# Patient Record
Sex: MALE | Race: WHITE | NOT HISPANIC OR LATINO | Employment: OTHER | URBAN - METROPOLITAN AREA
[De-identification: names, ages, dates, MRNs, and addresses within clinical notes are randomized per-mention and may not be internally consistent; named-entity substitution may affect disease eponyms.]

---

## 2017-03-02 ENCOUNTER — APPOINTMENT (OUTPATIENT)
Dept: RADIOLOGY | Facility: MEDICAL CENTER | Age: 82
End: 2017-03-02
Attending: EMERGENCY MEDICINE
Payer: MEDICARE

## 2017-03-02 ENCOUNTER — RESOLUTE PROFESSIONAL BILLING HOSPITAL PROF FEE (OUTPATIENT)
Dept: HOSPITALIST | Facility: MEDICAL CENTER | Age: 82
End: 2017-03-02
Payer: MEDICARE

## 2017-03-02 ENCOUNTER — HOSPITAL ENCOUNTER (OUTPATIENT)
Facility: MEDICAL CENTER | Age: 82
End: 2017-03-08
Attending: EMERGENCY MEDICINE | Admitting: HOSPITALIST
Payer: MEDICARE

## 2017-03-02 DIAGNOSIS — N30.00 ACUTE CYSTITIS WITHOUT HEMATURIA: ICD-10-CM

## 2017-03-02 DIAGNOSIS — R53.81 DEBILITY: ICD-10-CM

## 2017-03-02 DIAGNOSIS — G45.9 TRANSIENT CEREBRAL ISCHEMIA, UNSPECIFIED TYPE: ICD-10-CM

## 2017-03-02 DIAGNOSIS — R53.1 WEAKNESS: ICD-10-CM

## 2017-03-02 PROBLEM — N39.0 UTI (URINARY TRACT INFECTION): Status: ACTIVE | Noted: 2017-03-02

## 2017-03-02 LAB
ABO GROUP BLD: NORMAL
ALBUMIN SERPL BCP-MCNC: 3.9 G/DL (ref 3.2–4.9)
ALBUMIN/GLOB SERPL: 1.3 G/DL
ALP SERPL-CCNC: 83 U/L (ref 30–99)
ALT SERPL-CCNC: 13 U/L (ref 2–50)
ANION GAP SERPL CALC-SCNC: 8 MMOL/L (ref 0–11.9)
APPEARANCE UR: CLEAR
APTT PPP: 32.7 SEC (ref 24.7–36)
AST SERPL-CCNC: 18 U/L (ref 12–45)
BASOPHILS # BLD AUTO: 0.9 % (ref 0–1.8)
BASOPHILS # BLD: 0.08 K/UL (ref 0–0.12)
BILIRUB SERPL-MCNC: 0.4 MG/DL (ref 0.1–1.5)
BILIRUB UR QL STRIP.AUTO: NEGATIVE
BLD GP AB SCN SERPL QL: NORMAL
BUN SERPL-MCNC: 16 MG/DL (ref 8–22)
CALCIUM SERPL-MCNC: 9.6 MG/DL (ref 8.5–10.5)
CHLORIDE SERPL-SCNC: 104 MMOL/L (ref 96–112)
CO2 SERPL-SCNC: 23 MMOL/L (ref 20–33)
COLOR UR: COLORLESS
CREAT SERPL-MCNC: 0.97 MG/DL (ref 0.5–1.4)
CULTURE IF INDICATED INDCX: YES UA CULTURE
EOSINOPHIL # BLD AUTO: 0.34 K/UL (ref 0–0.51)
EOSINOPHIL NFR BLD: 3.7 % (ref 0–6.9)
ERYTHROCYTE [DISTWIDTH] IN BLOOD BY AUTOMATED COUNT: 41.9 FL (ref 35.9–50)
GFR SERPL CREATININE-BSD FRML MDRD: >60 ML/MIN/1.73 M 2
GLOBULIN SER CALC-MCNC: 3 G/DL (ref 1.9–3.5)
GLUCOSE SERPL-MCNC: 133 MG/DL (ref 65–99)
GLUCOSE UR STRIP.AUTO-MCNC: NEGATIVE MG/DL
HCT VFR BLD AUTO: 46.5 % (ref 42–52)
HGB BLD-MCNC: 16.3 G/DL (ref 14–18)
IMM GRANULOCYTES # BLD AUTO: 0.04 K/UL (ref 0–0.11)
IMM GRANULOCYTES NFR BLD AUTO: 0.4 % (ref 0–0.9)
INR PPP: 0.91 (ref 0.87–1.13)
KETONES UR STRIP.AUTO-MCNC: NEGATIVE MG/DL
LEUKOCYTE ESTERASE UR QL STRIP.AUTO: ABNORMAL
LYMPHOCYTES # BLD AUTO: 2.57 K/UL (ref 1–4.8)
LYMPHOCYTES NFR BLD: 27.7 % (ref 22–41)
MCH RBC QN AUTO: 31.3 PG (ref 27–33)
MCHC RBC AUTO-ENTMCNC: 35.1 G/DL (ref 33.7–35.3)
MCV RBC AUTO: 89.4 FL (ref 81.4–97.8)
MICRO URNS: ABNORMAL
MONOCYTES # BLD AUTO: 1.11 K/UL (ref 0–0.85)
MONOCYTES NFR BLD AUTO: 11.9 % (ref 0–13.4)
NEUTROPHILS # BLD AUTO: 5.15 K/UL (ref 1.82–7.42)
NEUTROPHILS NFR BLD: 55.4 % (ref 44–72)
NITRITE UR QL STRIP.AUTO: NEGATIVE
NRBC # BLD AUTO: 0.02 K/UL
NRBC BLD AUTO-RTO: 0.2 /100 WBC
PH UR STRIP.AUTO: 6.5 [PH]
PLATELET # BLD AUTO: 330 K/UL (ref 164–446)
PMV BLD AUTO: 9.1 FL (ref 9–12.9)
POTASSIUM SERPL-SCNC: 4 MMOL/L (ref 3.6–5.5)
PROT SERPL-MCNC: 6.9 G/DL (ref 6–8.2)
PROT UR QL STRIP: NEGATIVE MG/DL
PROTHROMBIN TIME: 12.5 SEC (ref 12–14.6)
RBC # BLD AUTO: 5.2 M/UL (ref 4.7–6.1)
RBC # URNS HPF: ABNORMAL /HPF
RBC UR QL AUTO: NEGATIVE
RH BLD: NORMAL
SODIUM SERPL-SCNC: 135 MMOL/L (ref 135–145)
SP GR UR STRIP.AUTO: 1
TROPONIN I SERPL-MCNC: 0.03 NG/ML (ref 0–0.04)
WBC # BLD AUTO: 9.3 K/UL (ref 4.8–10.8)
WBC #/AREA URNS HPF: ABNORMAL /HPF

## 2017-03-02 PROCEDURE — 96365 THER/PROPH/DIAG IV INF INIT: CPT

## 2017-03-02 PROCEDURE — 71010 DX-CHEST-PORTABLE (1 VIEW): CPT

## 2017-03-02 PROCEDURE — 96361 HYDRATE IV INFUSION ADD-ON: CPT

## 2017-03-02 PROCEDURE — 99285 EMERGENCY DEPT VISIT HI MDM: CPT

## 2017-03-02 PROCEDURE — 87040 BLOOD CULTURE FOR BACTERIA: CPT

## 2017-03-02 PROCEDURE — 85730 THROMBOPLASTIN TIME PARTIAL: CPT

## 2017-03-02 PROCEDURE — 87086 URINE CULTURE/COLONY COUNT: CPT

## 2017-03-02 PROCEDURE — 86850 RBC ANTIBODY SCREEN: CPT

## 2017-03-02 PROCEDURE — 93005 ELECTROCARDIOGRAM TRACING: CPT | Performed by: EMERGENCY MEDICINE

## 2017-03-02 PROCEDURE — 81001 URINALYSIS AUTO W/SCOPE: CPT

## 2017-03-02 PROCEDURE — G0378 HOSPITAL OBSERVATION PER HR: HCPCS

## 2017-03-02 PROCEDURE — 700105 HCHG RX REV CODE 258: Performed by: EMERGENCY MEDICINE

## 2017-03-02 PROCEDURE — 700111 HCHG RX REV CODE 636 W/ 250 OVERRIDE (IP): Performed by: EMERGENCY MEDICINE

## 2017-03-02 PROCEDURE — 80053 COMPREHEN METABOLIC PANEL: CPT

## 2017-03-02 PROCEDURE — 96375 TX/PRO/DX INJ NEW DRUG ADDON: CPT

## 2017-03-02 PROCEDURE — 85025 COMPLETE CBC W/AUTO DIFF WBC: CPT

## 2017-03-02 PROCEDURE — 70450 CT HEAD/BRAIN W/O DYE: CPT

## 2017-03-02 PROCEDURE — 84443 ASSAY THYROID STIM HORMONE: CPT

## 2017-03-02 PROCEDURE — 86900 BLOOD TYPING SEROLOGIC ABO: CPT

## 2017-03-02 PROCEDURE — 85610 PROTHROMBIN TIME: CPT

## 2017-03-02 PROCEDURE — 84484 ASSAY OF TROPONIN QUANT: CPT

## 2017-03-02 PROCEDURE — 36415 COLL VENOUS BLD VENIPUNCTURE: CPT

## 2017-03-02 PROCEDURE — 86901 BLOOD TYPING SEROLOGIC RH(D): CPT

## 2017-03-02 PROCEDURE — 99220 PR INITIAL OBSERVATION CARE,LEVL III: CPT | Performed by: HOSPITALIST

## 2017-03-02 RX ORDER — LORAZEPAM 2 MG/ML
0.5 INJECTION INTRAMUSCULAR EVERY 6 HOURS PRN
Status: DISCONTINUED | OUTPATIENT
Start: 2017-03-02 | End: 2017-03-08 | Stop reason: HOSPADM

## 2017-03-02 RX ORDER — ACETAMINOPHEN 325 MG/1
650 TABLET ORAL EVERY 6 HOURS PRN
Status: DISCONTINUED | OUTPATIENT
Start: 2017-03-02 | End: 2017-03-03

## 2017-03-02 RX ORDER — ONDANSETRON 2 MG/ML
4 INJECTION INTRAMUSCULAR; INTRAVENOUS EVERY 4 HOURS PRN
Status: DISCONTINUED | OUTPATIENT
Start: 2017-03-02 | End: 2017-03-03

## 2017-03-02 RX ORDER — HEPARIN SODIUM 5000 [USP'U]/ML
5000 INJECTION, SOLUTION INTRAVENOUS; SUBCUTANEOUS EVERY 8 HOURS
Status: DISCONTINUED | OUTPATIENT
Start: 2017-03-03 | End: 2017-03-08 | Stop reason: HOSPADM

## 2017-03-02 RX ORDER — DEXTROSE MONOHYDRATE 25 G/50ML
25 INJECTION, SOLUTION INTRAVENOUS
Status: DISCONTINUED | OUTPATIENT
Start: 2017-03-02 | End: 2017-03-08 | Stop reason: HOSPADM

## 2017-03-02 RX ORDER — SODIUM CHLORIDE 9 MG/ML
1000 INJECTION, SOLUTION INTRAVENOUS ONCE
Status: COMPLETED | OUTPATIENT
Start: 2017-03-02 | End: 2017-03-02

## 2017-03-02 RX ORDER — LORAZEPAM 1 MG/1
0.5 TABLET ORAL EVERY 6 HOURS PRN
Status: DISCONTINUED | OUTPATIENT
Start: 2017-03-02 | End: 2017-03-08 | Stop reason: HOSPADM

## 2017-03-02 RX ORDER — ASPIRIN 325 MG
325 TABLET ORAL DAILY
Status: DISCONTINUED | OUTPATIENT
Start: 2017-03-03 | End: 2017-03-08 | Stop reason: HOSPADM

## 2017-03-02 RX ORDER — ALPRAZOLAM 0.25 MG/1
.25-.5 TABLET ORAL 3 TIMES DAILY PRN
Status: DISCONTINUED | OUTPATIENT
Start: 2017-03-02 | End: 2017-03-03

## 2017-03-02 RX ORDER — SODIUM CHLORIDE 9 MG/ML
INJECTION, SOLUTION INTRAVENOUS CONTINUOUS
Status: DISCONTINUED | OUTPATIENT
Start: 2017-03-03 | End: 2017-03-07

## 2017-03-02 RX ORDER — ONDANSETRON 4 MG/1
4 TABLET, ORALLY DISINTEGRATING ORAL EVERY 4 HOURS PRN
Status: DISCONTINUED | OUTPATIENT
Start: 2017-03-02 | End: 2017-03-03

## 2017-03-02 RX ORDER — ACETAMINOPHEN 325 MG/1
650 TABLET ORAL EVERY 6 HOURS PRN
Status: DISCONTINUED | OUTPATIENT
Start: 2017-03-02 | End: 2017-03-08 | Stop reason: HOSPADM

## 2017-03-02 RX ORDER — ZOLPIDEM TARTRATE 5 MG/1
5 TABLET ORAL
Status: DISCONTINUED | OUTPATIENT
Start: 2017-03-02 | End: 2017-03-08 | Stop reason: HOSPADM

## 2017-03-02 RX ORDER — ATENOLOL 50 MG/1
50 TABLET ORAL DAILY
Status: DISCONTINUED | OUTPATIENT
Start: 2017-03-03 | End: 2017-03-08 | Stop reason: HOSPADM

## 2017-03-02 RX ORDER — BISACODYL 10 MG
10 SUPPOSITORY, RECTAL RECTAL
Status: DISCONTINUED | OUTPATIENT
Start: 2017-03-02 | End: 2017-03-08 | Stop reason: HOSPADM

## 2017-03-02 RX ORDER — LORAZEPAM 2 MG/ML
2 INJECTION INTRAMUSCULAR ONCE
Status: COMPLETED | OUTPATIENT
Start: 2017-03-02 | End: 2017-03-02

## 2017-03-02 RX ORDER — AMOXICILLIN 250 MG
2 CAPSULE ORAL 2 TIMES DAILY
Status: DISCONTINUED | OUTPATIENT
Start: 2017-03-03 | End: 2017-03-08 | Stop reason: HOSPADM

## 2017-03-02 RX ORDER — CEFTRIAXONE 1 G/1
1 INJECTION, POWDER, FOR SOLUTION INTRAMUSCULAR; INTRAVENOUS ONCE
Status: COMPLETED | OUTPATIENT
Start: 2017-03-02 | End: 2017-03-02

## 2017-03-02 RX ORDER — FENOFIBRATE 67 MG/1
134 CAPSULE ORAL DAILY
Status: DISCONTINUED | OUTPATIENT
Start: 2017-03-03 | End: 2017-03-08 | Stop reason: HOSPADM

## 2017-03-02 RX ORDER — ASPIRIN 300 MG/1
300 SUPPOSITORY RECTAL DAILY
Status: DISCONTINUED | OUTPATIENT
Start: 2017-03-03 | End: 2017-03-08 | Stop reason: HOSPADM

## 2017-03-02 RX ORDER — LABETALOL HYDROCHLORIDE 5 MG/ML
10 INJECTION, SOLUTION INTRAVENOUS EVERY 4 HOURS PRN
Status: DISCONTINUED | OUTPATIENT
Start: 2017-03-02 | End: 2017-03-08 | Stop reason: HOSPADM

## 2017-03-02 RX ORDER — HYDRALAZINE HYDROCHLORIDE 20 MG/ML
10 INJECTION INTRAMUSCULAR; INTRAVENOUS
Status: DISCONTINUED | OUTPATIENT
Start: 2017-03-02 | End: 2017-03-08 | Stop reason: HOSPADM

## 2017-03-02 RX ORDER — ASPIRIN 81 MG/1
324 TABLET, CHEWABLE ORAL DAILY
Status: DISCONTINUED | OUTPATIENT
Start: 2017-03-03 | End: 2017-03-08 | Stop reason: HOSPADM

## 2017-03-02 RX ORDER — POLYETHYLENE GLYCOL 3350 17 G/17G
1 POWDER, FOR SOLUTION ORAL
Status: DISCONTINUED | OUTPATIENT
Start: 2017-03-02 | End: 2017-03-08 | Stop reason: HOSPADM

## 2017-03-02 RX ORDER — PRAVASTATIN SODIUM 20 MG
40 TABLET ORAL NIGHTLY
Status: DISCONTINUED | OUTPATIENT
Start: 2017-03-03 | End: 2017-03-07

## 2017-03-02 RX ORDER — LORAZEPAM 2 MG/ML
0.5 INJECTION INTRAMUSCULAR ONCE
Status: DISCONTINUED | OUTPATIENT
Start: 2017-03-02 | End: 2017-03-02

## 2017-03-02 RX ORDER — LISINOPRIL 20 MG/1
20 TABLET ORAL 2 TIMES DAILY
Status: DISCONTINUED | OUTPATIENT
Start: 2017-03-03 | End: 2017-03-08 | Stop reason: HOSPADM

## 2017-03-02 RX ORDER — CEFTRIAXONE 1 G/1
1 INJECTION, POWDER, FOR SOLUTION INTRAMUSCULAR; INTRAVENOUS
Status: DISCONTINUED | OUTPATIENT
Start: 2017-03-03 | End: 2017-03-03

## 2017-03-02 RX ADMIN — CEFTRIAXONE 1 G: 1 INJECTION, POWDER, FOR SOLUTION INTRAMUSCULAR; INTRAVENOUS at 20:47

## 2017-03-02 RX ADMIN — SODIUM CHLORIDE 1000 ML: 9 INJECTION, SOLUTION INTRAVENOUS at 19:35

## 2017-03-02 RX ADMIN — LORAZEPAM 2 MG: 2 INJECTION INTRAMUSCULAR; INTRAVENOUS at 21:12

## 2017-03-02 ASSESSMENT — ENCOUNTER SYMPTOMS
WEAKNESS: 1
FEVER: 0
VOMITING: 0
DIARRHEA: 0
ABDOMINAL PAIN: 0
NAUSEA: 0
COUGH: 0
CHILLS: 0

## 2017-03-02 ASSESSMENT — PAIN SCALES - GENERAL: PAINLEVEL_OUTOF10: 9

## 2017-03-02 ASSESSMENT — LIFESTYLE VARIABLES: DO YOU DRINK ALCOHOL: NO

## 2017-03-02 NOTE — IP AVS SNAPSHOT
" <p align=\"LEFT\"><IMG SRC=\"//[email or web address]$/[email or web address]\" alt=\"Image\" WIDTH=\"50%\" HEIGHT=\"200\" BORDER=\"\"></p>                   Name:Leonardo Rodney  Medical Record Number:[id number]  CSN: [id number]    YOB: 1935   Age: 81 y.o.  Sex: male  HT:1.88 m (6' 2\") WT: 114.8 kg (253 lb 1.4 oz)          Admit Date: 3/2/2017     Discharge Date:   Today's Date: 3/8/2017  Attending Doctor:  Abhinav Salazar M.D.                  Allergies:  Review of patient's allergies indicates no known allergies.          Follow-up Information     1. Follow up with KESHAWN Medina. Go on 4/10/2017.    Specialty:  Family Medicine    Why:  Please arrive at 2:40 pm for your appointment. Thank you.    Contact information    [address] [place] [address] [id number] [phone]          2. Follow up with [facility] ([facility]).    Specialty:  Skilled Nursing Facility    Contact information    [address] [place] [id number] [phone]        3. Follow up with KESHAWN Medina.    Specialty:  Family Medicine    Contact information    [address] [place] [address] [address] [phone]           Medication List      Take these Medications        Instructions    Acetaminophen 650 MG Tabs    Take 650 mg by mouth every 6 hours as needed for Fever or Mild Pain (Temp greater than 100.5 F or Mild Pain (1-3)).   Dose:  650 mg       aspirin 325 MG Tabs   Commonly known as:  ASA    Take 1 Tab by mouth every day.   Dose:  325 mg       atenolol 50 MG Tabs   Commonly known as:  TENORMIN    Take 50 mg by mouth every day.   Dose:  50 mg       atorvastatin 40 MG Tabs   Commonly known as:  LIPITOR    Take 1 Tab by mouth every bedtime.   Dose:  40 mg       fenofibrate 145 MG Tabs   Commonly known as:  TRICOR    Take 145 mg by mouth every day.   Dose:  145 mg       heparin 5000 UNIT/ML Soln    Inject 1 mL as instructed every 8 hours.   Dose:  5000 Units       insulin lispro 100 UNIT/ML Soln   Commonly known " as:  HUMALOG    Inject 2-9 Units as instructed 4 Times a Day,Before Meals and at Bedtime.   Dose:  2-9 Units       lisinopril 20 MG Tabs   Commonly known as:  PRINIVIL    Take 20 mg by mouth 2 times a day.   Dose:  20 mg       multivitamin Tabs    Take 1 Tab by mouth every day.   Dose:  1 Tab       nystatin Powd   Commonly known as:  MYCOSTATIN    Apply to abdominal folds to fungal rash       oxybutynin 5 MG Tabs   Commonly known as:  DITROPAN    Take 5 mg by mouth 3 times a day as needed.    Indications: Urinary Urgency   Dose:  5 mg

## 2017-03-02 NOTE — IP AVS SNAPSHOT
3/8/2017          Leonardo Rodney  [address]  [place] [address]    Dear Leonardo:    [facility] wants to ensure your discharge home is safe and you or your loved ones have had all your questions answered regarding your care after you leave the hospital.    You may receive a telephone call within two days of your discharge.  This call is to make certain you understand your discharge instructions as well as ensure we provided you with the best care possible during your stay with us.     The call will only last approximately 3-5 minutes and will be done by a nurse.    Once again, we want to ensure your discharge home is safe and that you have a clear understanding of any next steps in your care.  If you have any questions or concerns, please do not hesitate to contact us, we are here for you.  Thank you for choosing [facility] for your healthcare needs.    Sincerely,    Erickson Seo    [facility] [place] [facility]

## 2017-03-02 NOTE — IP AVS SNAPSHOT
CareXtend Access Code: [email or web address]  Expires: 4/7/2017  4:47 PM    Your email address is not on file at [facility].  Email Addresses are required for you to sign up for CareXtend, please contact [phone] to verify your personal information and to provide your email address prior to attempting to register for CareXtend.    Leonardo PATRICK Rodney  [address]  [place] [address]    H-caret  A secure, online tool to manage your health information     [facility]’s CareXtend® is a secure, online tool that connects you to your personalized health information from the privacy of your home -- day or night - making it very easy for you to manage your healthcare. Once the activation process is completed, you can even access your medical information using the CareXtend layne, which is available for free in the Apple Layne store or Google Play store.     To learn more about CareXtend, visit [email or web address]    There are two levels of access available (as shown below):   My Chart Features  [facility] Primary Care Doctor [facility]  Specialists [facility]  Urgent  Care Non-[facility] Primary Care Doctor   Email your healthcare team securely and privately 24/7 X X X    Manage appointments: schedule your next appointment; view details of past/upcoming appointments X      Request prescription refills. X      View recent personal medical records, including lab and immunizations X X X X   View health record, including health history, allergies, medications X X X X   Read reports about your outpatient visits, procedures, consult and ER notes X X X X   See your discharge summary, which is a recap of your hospital and/or ER visit that includes your diagnosis, lab results, and care plan X X  X     How to register for CareXtend:  Once your e-mail address has been verified, follow the following steps to sign up for CareXtend.     1. Go to  [email or web address]  2. Click on the Sign Up Now box, which takes you to the New Member Sign Up page. You  will need to provide the following information:  a. Enter your [employer] Access Code exactly as it appears at the top of this page. (You will not need to use this code after you’ve completed the sign-up process. If you do not sign up before the expiration date, you must request a new code.)   b. Enter your date of birth.   c. Enter your home email address.   d. Click Submit, and follow the next screen’s instructions.  3. Create a FeZot ID. This will be your [employer] login ID and cannot be changed, so think of one that is secure and easy to remember.  4. Create a [employer] password. You can change your password at any time.  5. Enter your Password Reset Question and Answer. This can be used at a later time if you forget your password.   6. Enter your e-mail address. This allows you to receive e-mail notifications when new information is available in [employer].  7. Click Sign Up. You can now view your health information.    For assistance activating your [employer] account, call [phone]

## 2017-03-02 NOTE — IP AVS SNAPSHOT
" Home Care Instructions                                                                                                                  Name:Leonardo Rodney  Medical Record Number:[id number]  CSN: [id number]    YOB: 1935   Age: 81 y.o.  Sex: male  HT:1.88 m (6' 2\") WT: 114.8 kg (253 lb 1.4 oz)          Admit Date: 3/2/2017     Discharge Date:   Today's Date: 3/8/2017  Attending Doctor:  Abhinav Salazar M.D.                  Allergies:  Review of patient's allergies indicates no known allergies.            Discharge Instructions       Discharge Instructions    Discharged to other by ambulance with escort. Discharged via ambulance, hospital escort: Yes.  Special equipment needed: Not Applicable    Be sure to schedule a follow-up appointment with your primary care doctor or any specialists as instructed.     Discharge Plan:   Diet Plan: Discussed  Activity Level: Discussed  Confirmed Follow up Appointment: Appointment Scheduled  Confirmed Symptoms Management: Discussed  Medication Reconciliation Updated: Yes  Influenza Vaccine Indication: Not indicated: Previously immunized this influenza season and > 8 years of age    I understand that a diet low in cholesterol, fat, and sodium is recommended for good health. Unless I have been given specific instructions below for another diet, I accept this instruction as my diet prescription.   Other diet: Regular, thin liquids    Special Instructions: None    · Is patient discharged on Warfarin / Coumadin?   No     · Is patient Post Blood Transfusion?  No    Depression / Suicide Risk    As you are discharged from this [facility] facility, it is important to learn how to keep safe from harming yourself.    Recognize the warning signs:  · Abrupt changes in personality, positive or negative- including increase in energy   · Giving away possessions  · Change in eating patterns- significant weight changes-  positive or negative  · Change in sleeping patterns- unable " to sleep or sleeping all the time   · Unwillingness or inability to communicate  · Depression  · Unusual sadness, discouragement and loneliness  · Talk of wanting to die  · Neglect of personal appearance   · Rebelliousness- reckless behavior  · Withdrawal from people/activities they love  · Confusion- inability to concentrate     If you or a loved one observes any of these behaviors or has concerns about self-harm, here's what you can do:  · Talk about it- your feelings and reasons for harming yourself  · Remove any means that you might use to hurt yourself (examples: pills, rope, extension cords, firearm)  · Get professional help from the community (Mental Health, Substance Abuse, psychological counseling)  · Do not be alone:Call your Safe Contact- someone whom you trust who will be there for you.  · Call your local CRISIS HOTLINE 807-7198 or 876-960-5536  · Call your local Children's Mobile Crisis Response Team [place] (286) 341-8391 or www.HyperQuest  · Call the toll free National Suicide Prevention Hotlines   · National Suicide Prevention Lifeline 349-875-YKMU (1843)  · National Hope Line Network 800-SUICIDE (811-0423)        Follow-up Information     1. Follow up with KESHAWN Medina. Go on 4/10/2017.    Specialty:  Family Medicine    Why:  Please arrive at 2:40 pm for your appointment. Thank you.    Contact information    [address] [address]  [phone]          2. Follow up with [facility] [place] ([facility]).    Specialty:  Skilled Nursing Facility    Contact information    [address] [address]  [phone]        3. Follow up with KESHAWN Medina.    Specialty:  Family Medicine    Contact information    [address] [address]  [phone]           Discharge Medication Instructions:    Below are the medications your physician expects you to take upon discharge:    Review all your home medications and newly ordered  medications with your doctor and/or pharmacist. Follow medication instructions as directed by your doctor and/or pharmacist.    Please keep your medication list with you and share with your physician.               Medication List      START taking these medications        Instructions    aspirin 325 MG Tabs   Last time this was given:  325 mg on 3/8/2017  9:23 AM   Commonly known as:  ASA    Take 1 Tab by mouth every day.   Dose:  325 mg       atorvastatin 40 MG Tabs   Last time this was given:  40 mg on 3/7/2017  8:30 PM   Commonly known as:  LIPITOR    Take 1 Tab by mouth every bedtime.   Dose:  40 mg       heparin 5000 UNIT/ML Soln   Last time this was given:  5,000 Units on 3/8/2017  2:33 PM    Inject 1 mL as instructed every 8 hours.   Dose:  5000 Units       insulin lispro 100 UNIT/ML Soln   Last time this was given:  3 Units on 3/8/2017 12:23 PM   Commonly known as:  HUMALOG    Inject 2-9 Units as instructed 4 Times a Day,Before Meals and at Bedtime.   Dose:  2-9 Units       nystatin Powd   Last time this was given:  1,500,000 Units on 3/8/2017  2:37 PM   Commonly known as:  MYCOSTATIN    Apply to abdominal folds to fungal rash         CONTINUE taking these medications        Instructions    Acetaminophen 650 MG Tabs   Last time this was given:  650 mg on 3/4/2017  8:42 PM    Take 650 mg by mouth every 6 hours as needed for Fever or Mild Pain (Temp greater than 100.5 F or Mild Pain (1-3)).   Dose:  650 mg       atenolol 50 MG Tabs   Last time this was given:  50 mg on 3/8/2017  9:23 AM   Commonly known as:  TENORMIN    Take 50 mg by mouth every day.   Dose:  50 mg       fenofibrate 145 MG Tabs   Commonly known as:  TRICOR    Take 145 mg by mouth every day.   Dose:  145 mg       lisinopril 20 MG Tabs   Last time this was given:  20 mg on 3/8/2017  9:24 AM   Commonly known as:  PRINIVIL    Take 20 mg by mouth 2 times a day.   Dose:  20 mg       multivitamin Tabs   Last time this was given:  1 Tab on 3/8/2017   9:24 AM    Take 1 Tab by mouth every day.   Dose:  1 Tab       oxybutynin 5 MG Tabs   Commonly known as:  DITROPAN    Take 5 mg by mouth 3 times a day as needed.    Indications: Urinary Urgency   Dose:  5 mg         STOP taking these medications     pravastatin 40 MG tablet   Commonly known as:  PRAVACHOL               Instructions           Diet / Nutrition:    Follow any diet instructions given to you by your doctor or the dietician, including how much salt (sodium) you are allowed each day.    If you are overweight, talk to your doctor about a weight reduction plan.    Activity:    Remain physically active following your doctor's instructions about exercise and activity.    Rest often.     Any time you become even a little tired or short of breath, SIT DOWN and rest.    Worsening Symptoms:    Report any of the following signs and symptoms to the doctor's office immediately:    *Pain of jaw, arm, or neck  *Chest pain not relieved by medication                               *Dizziness or loss of consciousness  *Difficulty breathing even when at rest   *More tired than usual                                       *Bleeding drainage or swelling of surgical site  *Swelling of feet, ankles, legs or stomach                 *Fever (>100ºF)  *Pink or blood tinged sputum  *Weight gain (3lbs/day or 5lbs /week)           *Shock from internal defibrillator (if applicable)  *Palpitations or irregular heartbeats                *Cool and/or numb extremities    Stroke Awareness    Common Risk Factors for Stroke include:    Age  Atrial Fibrillation  Carotid Artery Stenosis  Diabetes Mellitus  Excessive alcohol consumption  High blood pressure  Overweight   Physical inactivity  Smoking    Warning signs and symptoms of a stroke include:    *Sudden numbness or weakness of the face, arm or leg (especially on one side of the body).  *Sudden confusion, trouble speaking or understanding.  *Sudden trouble seeing in one or both  eyes.  *Sudden trouble walking, dizziness, loss of balance or coordination.Sudden severe headache with no known cause.    It is very important to get treatment quickly when a stroke occurs. If you experience any of the above warning signs, call 911 immediately.                   Disclaimer         Quit Smoking / Tobacco Use:    I understand the use of any tobacco products increases my chance of suffering from future heart disease or stroke and could cause other illnesses which may shorten my life. Quitting the use of tobacco products is the single most important thing I can do to improve my health. For further information on smoking / tobacco cessation call a Toll Free Quit Line at 1-339.759.5466 (*National Cancer Welcome) or 1-740.491.1188 (American Lung Association) or you can access the web based program at www.lungCloudAptitude.org.    [place] Tobacco Users Help Line:  (815) 884-2200       Toll Free: 1-780.628.1222  Quit Tobacco Program [facility] (485)785-6680    Crisis Hotline:    [place] Crisis Hotline:  8-638-AOBVNRX or 1-551.286.7968    [place] Crisis Hotline:    1-867.115.7464 or 233-348-7268    Discharge Survey:   Thank you for choosing [facility]. We hope we did everything we could to make your hospital stay a pleasant one. You may be receiving a phone survey and we would appreciate your time and participation in answering the questions. Your input is very valuable to us in our efforts to improve our service to our patients and their families.        My signature on this form indicates that:    1. I have reviewed and understand the above information.  2. My questions regarding this information have been answered to my satisfaction.  3. I have formulated a plan with my discharge nurse to obtain my prescribed medications for home.                  Disclaimer         __________________________________                     __________       ________                       Patient Signature                                                  Date                    Time

## 2017-03-03 ENCOUNTER — APPOINTMENT (OUTPATIENT)
Dept: RADIOLOGY | Facility: MEDICAL CENTER | Age: 82
End: 2017-03-03
Attending: HOSPITALIST
Payer: MEDICARE

## 2017-03-03 PROBLEM — I10 HTN (HYPERTENSION): Status: ACTIVE | Noted: 2017-03-03

## 2017-03-03 PROBLEM — E78.5 DYSLIPIDEMIA: Status: ACTIVE | Noted: 2017-03-03

## 2017-03-03 PROBLEM — E11.9 TYPE 2 DIABETES MELLITUS (HCC): Status: ACTIVE | Noted: 2017-03-03

## 2017-03-03 LAB
ABO GROUP BLD: NORMAL
ANION GAP SERPL CALC-SCNC: 9 MMOL/L (ref 0–11.9)
BUN SERPL-MCNC: 12 MG/DL (ref 8–22)
CALCIUM SERPL-MCNC: 9.7 MG/DL (ref 8.5–10.5)
CHLORIDE SERPL-SCNC: 105 MMOL/L (ref 96–112)
CHOLEST SERPL-MCNC: 141 MG/DL (ref 100–199)
CO2 SERPL-SCNC: 24 MMOL/L (ref 20–33)
CREAT SERPL-MCNC: 0.89 MG/DL (ref 0.5–1.4)
ERYTHROCYTE [DISTWIDTH] IN BLOOD BY AUTOMATED COUNT: 41.8 FL (ref 35.9–50)
EST. AVERAGE GLUCOSE BLD GHB EST-MCNC: 166 MG/DL
GFR SERPL CREATININE-BSD FRML MDRD: >60 ML/MIN/1.73 M 2
GLUCOSE BLD-MCNC: 113 MG/DL (ref 65–99)
GLUCOSE BLD-MCNC: 120 MG/DL (ref 65–99)
GLUCOSE BLD-MCNC: 147 MG/DL (ref 65–99)
GLUCOSE BLD-MCNC: 168 MG/DL (ref 65–99)
GLUCOSE SERPL-MCNC: 127 MG/DL (ref 65–99)
HBA1C MFR BLD: 7.4 % (ref 0–5.6)
HCT VFR BLD AUTO: 47.4 % (ref 42–52)
HDLC SERPL-MCNC: 38 MG/DL
HGB BLD-MCNC: 16.5 G/DL (ref 14–18)
LDLC SERPL CALC-MCNC: 81 MG/DL
LV EJECT FRACT  99904: 55
LV EJECT FRACT MOD 2C 99903: 48.6
LV EJECT FRACT MOD 4C 99902: 52.96
LV EJECT FRACT MOD BP 99901: 57.44
MCH RBC QN AUTO: 31 PG (ref 27–33)
MCHC RBC AUTO-ENTMCNC: 34.8 G/DL (ref 33.7–35.3)
MCV RBC AUTO: 89.1 FL (ref 81.4–97.8)
PLATELET # BLD AUTO: 315 K/UL (ref 164–446)
PMV BLD AUTO: 8.8 FL (ref 9–12.9)
POTASSIUM SERPL-SCNC: 3.7 MMOL/L (ref 3.6–5.5)
RBC # BLD AUTO: 5.32 M/UL (ref 4.7–6.1)
SODIUM SERPL-SCNC: 138 MMOL/L (ref 135–145)
TRIGL SERPL-MCNC: 109 MG/DL (ref 0–149)
TSH SERPL DL<=0.005 MIU/L-ACNC: 2.87 UIU/ML (ref 0.3–3.7)
WBC # BLD AUTO: 9.3 K/UL (ref 4.8–10.8)

## 2017-03-03 PROCEDURE — 80061 LIPID PANEL: CPT

## 2017-03-03 PROCEDURE — 700111 HCHG RX REV CODE 636 W/ 250 OVERRIDE (IP): Performed by: HOSPITALIST

## 2017-03-03 PROCEDURE — G8996 SWALLOW CURRENT STATUS: HCPCS | Mod: CI

## 2017-03-03 PROCEDURE — G8979 MOBILITY GOAL STATUS: HCPCS | Mod: CI

## 2017-03-03 PROCEDURE — G8997 SWALLOW GOAL STATUS: HCPCS | Mod: CH

## 2017-03-03 PROCEDURE — 85027 COMPLETE CBC AUTOMATED: CPT

## 2017-03-03 PROCEDURE — 93880 EXTRACRANIAL BILAT STUDY: CPT

## 2017-03-03 PROCEDURE — G0378 HOSPITAL OBSERVATION PER HR: HCPCS

## 2017-03-03 PROCEDURE — 80048 BASIC METABOLIC PNL TOTAL CA: CPT

## 2017-03-03 PROCEDURE — 99225 PR SUBSEQUENT OBSERVATION CARE,LEVEL II: CPT | Performed by: INTERNAL MEDICINE

## 2017-03-03 PROCEDURE — 36415 COLL VENOUS BLD VENIPUNCTURE: CPT

## 2017-03-03 PROCEDURE — 93306 TTE W/DOPPLER COMPLETE: CPT

## 2017-03-03 PROCEDURE — 93880 EXTRACRANIAL BILAT STUDY: CPT | Mod: 26 | Performed by: SURGERY

## 2017-03-03 PROCEDURE — 93306 TTE W/DOPPLER COMPLETE: CPT | Mod: 26 | Performed by: INTERNAL MEDICINE

## 2017-03-03 PROCEDURE — 92610 EVALUATE SWALLOWING FUNCTION: CPT

## 2017-03-03 PROCEDURE — 83036 HEMOGLOBIN GLYCOSYLATED A1C: CPT

## 2017-03-03 PROCEDURE — G8978 MOBILITY CURRENT STATUS: HCPCS | Mod: CK

## 2017-03-03 PROCEDURE — 96366 THER/PROPH/DIAG IV INF ADDON: CPT

## 2017-03-03 PROCEDURE — 97162 PT EVAL MOD COMPLEX 30 MIN: CPT

## 2017-03-03 PROCEDURE — A9270 NON-COVERED ITEM OR SERVICE: HCPCS | Performed by: HOSPITALIST

## 2017-03-03 PROCEDURE — G8987 SELF CARE CURRENT STATUS: HCPCS | Mod: CL

## 2017-03-03 PROCEDURE — 700105 HCHG RX REV CODE 258: Performed by: HOSPITALIST

## 2017-03-03 PROCEDURE — G8988 SELF CARE GOAL STATUS: HCPCS | Mod: CI

## 2017-03-03 PROCEDURE — 97166 OT EVAL MOD COMPLEX 45 MIN: CPT

## 2017-03-03 PROCEDURE — 82962 GLUCOSE BLOOD TEST: CPT

## 2017-03-03 PROCEDURE — 700102 HCHG RX REV CODE 250 W/ 637 OVERRIDE(OP): Performed by: HOSPITALIST

## 2017-03-03 RX ADMIN — HEPARIN SODIUM 5000 UNITS: 5000 INJECTION, SOLUTION INTRAVENOUS; SUBCUTANEOUS at 10:17

## 2017-03-03 RX ADMIN — ATENOLOL 50 MG: 50 TABLET ORAL at 10:09

## 2017-03-03 RX ADMIN — THERA TABS 1 TABLET: TAB at 10:09

## 2017-03-03 RX ADMIN — NYSTATIN 1 APPLICATION: 100000 POWDER TOPICAL at 15:00

## 2017-03-03 RX ADMIN — PRAVASTATIN SODIUM 40 MG: 20 TABLET ORAL at 20:29

## 2017-03-03 RX ADMIN — Medication 2 TABLET: at 10:09

## 2017-03-03 RX ADMIN — PRAVASTATIN SODIUM 40 MG: 20 TABLET ORAL at 02:13

## 2017-03-03 RX ADMIN — ASPIRIN 325 MG: 325 TABLET, COATED ORAL at 10:09

## 2017-03-03 RX ADMIN — LISINOPRIL 20 MG: 20 TABLET ORAL at 10:09

## 2017-03-03 RX ADMIN — NYSTATIN 1500000 UNITS: 100000 POWDER TOPICAL at 20:33

## 2017-03-03 RX ADMIN — LISINOPRIL 20 MG: 20 TABLET ORAL at 20:30

## 2017-03-03 RX ADMIN — HEPARIN SODIUM 5000 UNITS: 5000 INJECTION, SOLUTION INTRAVENOUS; SUBCUTANEOUS at 20:42

## 2017-03-03 RX ADMIN — CEFTRIAXONE SODIUM 1 G: 1 INJECTION, POWDER, FOR SOLUTION INTRAMUSCULAR; INTRAVENOUS at 02:06

## 2017-03-03 RX ADMIN — SODIUM CHLORIDE: 9 INJECTION, SOLUTION INTRAVENOUS at 01:21

## 2017-03-03 RX ADMIN — HEPARIN SODIUM 5000 UNITS: 5000 INJECTION, SOLUTION INTRAVENOUS; SUBCUTANEOUS at 17:00

## 2017-03-03 RX ADMIN — HEPARIN SODIUM 5000 UNITS: 5000 INJECTION, SOLUTION INTRAVENOUS; SUBCUTANEOUS at 02:14

## 2017-03-03 RX ADMIN — Medication 2 TABLET: at 20:29

## 2017-03-03 RX ADMIN — LORAZEPAM 0.5 MG: 2 INJECTION INTRAMUSCULAR; INTRAVENOUS at 02:17

## 2017-03-03 RX ADMIN — CEFTRIAXONE SODIUM 1 G: 1 INJECTION, POWDER, FOR SOLUTION INTRAMUSCULAR; INTRAVENOUS at 21:00

## 2017-03-03 RX ADMIN — Medication 2 TABLET: at 02:14

## 2017-03-03 ASSESSMENT — GAIT ASSESSMENTS: GAIT LEVEL OF ASSIST: UNABLE TO PARTICIPATE

## 2017-03-03 ASSESSMENT — PAIN SCALES - GENERAL
PAINLEVEL_OUTOF10: 0

## 2017-03-03 ASSESSMENT — LIFESTYLE VARIABLES
EVER HAD A DRINK FIRST THING IN THE MORNING TO STEADY YOUR NERVES TO GET RID OF A HANGOVER: NO
CONSUMPTION TOTAL: NEGATIVE
ALCOHOL_USE: YES
HAVE PEOPLE ANNOYED YOU BY CRITICIZING YOUR DRINKING: NO
TOTAL SCORE: 0
HOW MANY TIMES IN THE PAST YEAR HAVE YOU HAD 5 OR MORE DRINKS IN A DAY: 0
HAVE YOU EVER FELT YOU SHOULD CUT DOWN ON YOUR DRINKING: NO
EVER_SMOKED: NEVER
ON A TYPICAL DAY WHEN YOU DRINK ALCOHOL HOW MANY DRINKS DO YOU HAVE: 2
AVERAGE NUMBER OF DAYS PER WEEK YOU HAVE A DRINK CONTAINING ALCOHOL: 1
EVER FELT BAD OR GUILTY ABOUT YOUR DRINKING: NO

## 2017-03-03 ASSESSMENT — ENCOUNTER SYMPTOMS
CHILLS: 0
SHORTNESS OF BREATH: 0
DOUBLE VISION: 0
PALPITATIONS: 0
WEIGHT LOSS: 0
NECK PAIN: 0
COUGH: 0
MYALGIAS: 0
PHOTOPHOBIA: 0

## 2017-03-03 ASSESSMENT — ACTIVITIES OF DAILY LIVING (ADL): TOILETING: UNABLE TO DETERMINE AT THIS TIME

## 2017-03-03 NOTE — ED NOTES
Med rec unable to obtain  Pt in unable to answer what medication he takes or where he fills medication   Called emergency contacts with two non working numbers and a number not belonging to the Pt or contact

## 2017-03-03 NOTE — ED NOTES
Pt has been persistently restless and anxious, continually on call light, needs being addressed. ERP aware of anxiety and restlessness, Pt medicated w/ Ativan per MAR.

## 2017-03-03 NOTE — THERAPY
"Occupational Therapy Evaluation completed.   Functional Status:  Pt presenting to skilled OT services with significant decline with ADLs, impaired balance, decreased activity tolerance, and cognition. Performed supine<>sit with min/mod a and vc's for techniques and proper hand placement, STS from eob with min a using FWW, donning/doffing hospital gown with min a, toileting max a, LB dressing max a, had multiple posterior lob and required min a to correct, in standing unable to wt-shift.  Pt would benefit from acute and post acute skilled OT services prior to d/c home  Plan of Care: Will benefit from Occupational Therapy 3 times per week  Discharge Recommendations:  Equipment: Will Continue to Assess for Equipment Needs. Post-acute therapy recommended before discharged home.    See \"Rehab Therapy-Acute\" Patient Summary Report for complete documentation.    "

## 2017-03-03 NOTE — ED PROVIDER NOTES
ED Provider Note    Scribed for Jacob Leone M.D. by Jorge Pradhan. 3/2/2017, 6:13 PM.    Primary care provider: KESHAWN Medina  Means of arrival: Ambulance  History obtained from: Patient  History limited by: None     CHIEF COMPLAINT  Chief Complaint   Patient presents with   • Weakness     HPI  Leonardo Rodney is a 81 y.o. male who was transported to the Emergency Department via EMS due to weakness. The patient has had worsening generalized weakness in the last few days. This morning, patient woke up and states he has not been able to ambulate secondary to his worsening weakness. Patient last ambulated last night at 10:30 PM when he used the restroom. Patient's weakness is worse on his left side. He denies any recent trauma or injury. The patient has a history of Diabetes with resulting neuropathy. Patient denies any history of CVA.  He denies any nausea, vomiting, abdominal pain, diarrhea, fever, chills, cough.     REVIEW OF SYSTEMS  Review of Systems   Constitutional: Negative for fever and chills.   Respiratory: Negative for cough.    Gastrointestinal: Negative for nausea, vomiting, abdominal pain and diarrhea.   Neurological: Positive for weakness.   All other systems reviewed and are negative.    C.     PAST MEDICAL HISTORY   has a past medical history of Diabetes; Hypertension; Neuropathy (CMS-HCC); and CAD (coronary artery disease).    SURGICAL HISTORY  patient denies any surgical history    SOCIAL HISTORY  Social History   Substance Use Topics   • Smoking status: Former Smoker -- 4.00 packs/day   • Smokeless tobacco: None   • Alcohol Use: No      Comment: quit 1998      History   Drug Use No     FAMILY HISTORY  History reviewed. No pertinent family history.    CURRENT MEDICATIONS  Home Medications     Reviewed by [place] (Pharmacy Tech) on 03/02/17 at 2138  Med List Status: Unable to Obtain    Medication Last Dose Status    acetaminophen 650 MG TABS  Active    alprazolam  "(XANAX) 0.5 MG TABS  Active    atenolol (TENORMIN) 50 MG TABS 1/9/2013 Active    ciprofloxacin (CIPRO) 500 MG TABS  Active    fenofibrate (TRICOR) 145 MG TABS 1/9/2013 Active    insulin aspart (NOVOLOG) 100 UNIT/ML SOLN  Active    lisinopril (PRINIVIL) 20 MG TABS 1/9/2013 Active    magnesium hydroxide (MILK OF MAGNESIA) 400 MG/5ML SUSP  Active    multivitamin (THERAGRAN) TABS  Active    oxybutynin (DITROPAN) 5 MG TABS 1/9/2013 Active    pravastatin (PRAVACHOL) 40 MG tablet 1/9/2013 Active              ALLERGIES  No Known Allergies    PHYSICAL EXAM  VITAL SIGNS: /100 mmHg  Pulse 92  Temp(Src) 36.9 °C (98.4 °F)  Resp 16  Ht 1.88 m (6' 2.02\")  SpO2 94%    Constitutional: Elderly appearing, no acute distress   HENT: Normocephalic, Atraumatic, Bilateral external ears normal, oropharynx moist, No oral exudates, Nose normal.   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  Lymphatic: No lymphadenopathy noted.   Cardiovascular: Regular rate and rhythm without murmurs gallops or rubs.   Thorax & Lungs: No respiratory distress. Breathing comfortably. Lungs are clear to auscultation bilaterally, there are no wheezes no rales. Chest wall is nontender.  Abdomen: Soft, nontender, nondistended. Bowel sounds are present.   Skin:  Abrasions to bilateral lower extremities   Back: No tenderness, No CVA tenderness.  Musculoskeletal: No tenderness to palpation or major deformities noted. Intact distal pulses, no clubbing, no cyanosis  Neurologic: Alert & oriented x 3, Cranial nerves II-XII intact, sensation intact throughout, 2/5 strength in left lower extremity, 3/5 strength in left upper extremity, 4/5 strength in right upper and right lower extremities. DTR's are 1+   Psychiatric: Affect normal, Judgment normal, Mood normal.     LABS  Results for orders placed or performed during the hospital encounter of 03/02/17   CBC WITH DIFFERENTIAL "   Result Value Ref Range    WBC 9.3 4.8 - 10.8 K/uL    RBC 5.20 4.70 - 6.10 M/uL    Hemoglobin 16.3 14.0 - 18.0 g/dL    Hematocrit 46.5 42.0 - 52.0 %    MCV 89.4 81.4 - 97.8 fL    MCH 31.3 27.0 - 33.0 pg    MCHC 35.1 33.7 - 35.3 g/dL    RDW 41.9 35.9 - 50.0 fL    Platelet Count 330 164 - 446 K/uL    MPV 9.1 9.0 - 12.9 fL    Neutrophils-Polys 55.40 44.00 - 72.00 %    Lymphocytes 27.70 22.00 - 41.00 %    Monocytes 11.90 0.00 - 13.40 %    Eosinophils 3.70 0.00 - 6.90 %    Basophils 0.90 0.00 - 1.80 %    Immature Granulocytes 0.40 0.00 - 0.90 %    Nucleated RBC 0.20 /100 WBC    Neutrophils (Absolute) 5.15 1.82 - 7.42 K/uL    Lymphs (Absolute) 2.57 1.00 - 4.80 K/uL    Monos (Absolute) 1.11 (H) 0.00 - 0.85 K/uL    Eos (Absolute) 0.34 0.00 - 0.51 K/uL    Baso (Absolute) 0.08 0.00 - 0.12 K/uL    Immature Granulocytes (abs) 0.04 0.00 - 0.11 K/uL    NRBC (Absolute) 0.02 K/uL   COMP METABOLIC PANEL   Result Value Ref Range    Sodium 135 135 - 145 mmol/L    Potassium 4.0 3.6 - 5.5 mmol/L    Chloride 104 96 - 112 mmol/L    Co2 23 20 - 33 mmol/L    Anion Gap 8.0 0.0 - 11.9    Glucose 133 (H) 65 - 99 mg/dL    Bun 16 8 - 22 mg/dL    Creatinine 0.97 0.50 - 1.40 mg/dL    Calcium 9.6 8.5 - 10.5 mg/dL    AST(SGOT) 18 12 - 45 U/L    ALT(SGPT) 13 2 - 50 U/L    Alkaline Phosphatase 83 30 - 99 U/L    Total Bilirubin 0.4 0.1 - 1.5 mg/dL    Albumin 3.9 3.2 - 4.9 g/dL    Total Protein 6.9 6.0 - 8.2 g/dL    Globulin 3.0 1.9 - 3.5 g/dL    A-G Ratio 1.3 g/dL   PROTHROMBIN TIME   Result Value Ref Range    PT 12.5 12.0 - 14.6 sec    INR 0.91 0.87 - 1.13   APTT   Result Value Ref Range    APTT 32.7 24.7 - 36.0 sec   COD (ADULT)   Result Value Ref Range    ABO Grouping Only O     Rh Grouping Only POS     Antibody Screen-Cod NEG    TROPONIN   Result Value Ref Range    Troponin I 0.03 0.00 - 0.04 ng/mL   URINALYSIS CULTURE, IF INDICATED   Result Value Ref Range    Micro Urine Req Microscopic     Color Colorless     Character Clear     Specific Gravity  1.005 <1.035    Ph 6.5 5.0-8.0    Glucose Negative Negative mg/dL    Ketones Negative Negative mg/dL    Protein Negative Negative mg/dL    Bilirubin Negative Negative    Nitrite Negative Negative    Leukocyte Esterase Large (A) Negative    Occult Blood Negative Negative    Culture Indicated Yes UA Culture   ESTIMATED GFR   Result Value Ref Range    GFR If African American >60 >60 mL/min/1.73 m 2    GFR If Non African American >60 >60 mL/min/1.73 m 2   URINE MICROSCOPIC (W/UA)   Result Value Ref Range    WBC 20-50 (A) /hpf    RBC 2-5 (A) /hpf   EKG (NOW)   Result Value Ref Range    Report       [facility] Emergency Dept.    Test Date:  2017  Pt Name:    MARY HERNÁNDEZ               Department: ER  MRN:        [id number]                      Room:       [facility]  Gender:     M                            Technician: [id number]  :        [id number]                   Requested By:NARESH STALLINGS  Order #:    [id number]                    Reading MD:    Measurements  Intervals                                Axis  Rate:       90                           P:          14  MS:         208                          QRS:        -85  QRSD:       174                          T:          82  QT:         440  QTc:        539    Interpretive Statements  SINUS RHYTHM  RBBB AND LAFB  No previous ECG available for comparison        All labs reviewed by me.    EKG  Interpreted by me    Rhythm: normal sinus  Rate: 90 which is normal  Axis: Leftward -85  Ectopy: none  Conduction: RBBB pattern with left anterior vesiuclar block pattern   ST Segments:no acute change  T Waves: no acute change  Q Waves: none    Clinical Impression: Nonspecific EKG unchanged from prior. No signs of acute MI.      RADIOLOGY  DX-CHEST-PORTABLE (1 VIEW)   Final Result      Vascular congestion and indistinctness is compatible with edema favored over bronchopneumonia      CT-HEAD W/O   Final Result      1.  Diffuse atrophy and white matter changes.    2.  No acute intracranial hemorrhage or territorial infarct.   3.  Cerebral atherosclerotic disease.        The radiologist's interpretation of all radiological studies have been reviewed by me.    COURSE & MEDICAL DECISION MAKING  Pertinent Labs & Imaging studies reviewed. (See chart for details)    6:13 PM - Patient seen and examined at bedside. Patient will be treated with NS IV. Ordered Chest X Ray, CT Head, CBC, CMP, prothrombin time, APTT, COD, troponin, urinalysis to evaluate his symptoms. The differential diagnoses include but are not limited to: TIA    8:42 PM Spoke with Dr. Hillman, Hospitalist, about the patient's condition. Dr. Hlilman was updated of patient's lab and radiology results, and is agreeable for admission.      8:57 PM Recheck: Patient re-evaluated at beside. Patient reports his weakness has improved, he is now able to sit up in his bed. Patient was updated of his lab and radiology results which indicated urinary tract infection.     Decision Making:  Patient presents for evaluation. Initially states that he has left arm weakness, some on exam is as above. Then later he states that he can actually move his left arm and left leg better. Patient does have a urinary tract infection would certainly lead to some of his generalized weakness and urinary incontinence but is also possible patient may have had a TIA versus other secondary gain issues. At this point, I do for the patient should be admitted the hospital. I have given the patient Rocephin for his urinary tract infection    Disposition:  Patient will be admitted to the hospital under the care of Dr. Hillman (Hospitalist) in guarded condition.      FINAL IMPRESSION  1. Acute cystitis without hematuria    2. Weakness    3. Transient cerebral ischemia, unspecified type          I, Jorge Pradhan (Jeison), am scribing for, and in the presence of, Jacob Leone M.D..    Electronically signed by: Jorge Pradhan (Jeison), 3/2/2017    IJacob  M.D. personally performed the services described in this documentation, as scribed by Jorge Pradhan in my presence, and it is both accurate and complete.    The note accurately reflects work and decisions made by me.  Jacob Leone  3/2/2017  11:21 PM

## 2017-03-03 NOTE — THERAPY
"Physical Therapy Treatment completed.   Bed Mobility:  Supine to Sit: Minimal Assist (strong cues needed for log roll. )  Transfers: Sit to Stand: Minimal Assist  Gait: Level Of Assist: Unable to Participate with Front-Wheel Walker, unable to advance R LE, and able to move L LE slightly.       Plan of Care: Will benefit from Physical Therapy 3 times per week  Discharge Recommendations: Equipment: Will Continue to Assess for Equipment Needs. Post-acute therapy recommended before discharged home.     See \"Rehab Therapy-Acute\" Patient Summary Report for complete documentation.       "

## 2017-03-03 NOTE — PROGRESS NOTES
Patient received into S196.   Patient Aox4. Oriented patient to room, use of call light, etc. Verbalizes understanding.   Patient requesting to sit in chair. Patient able to stand pivot to chair with 2 person assist. Tolerated sitting in chair, well. Patient back to bed after 5 minutes.   Tele monitor in place.   Patient incontinent of urine. Condom catheter placed.   Redness on buttocks and groin noted upon assessment. Picture taken and uploaded to chart.   Patient made NPO. Did pass the dysphagia screening however, started coughing on water when medications were given (meds given one at a time). Swallow evaluation ordered.   IV fluids started. Abx infusing.   Patient able to move all extremities.  All needs addressed at this time. Call light and personal belongings within reach, bed in lowest position, treaded socks in place, bed alarm on and hourly rounding done.

## 2017-03-03 NOTE — PROGRESS NOTES
Hospital Medicine Progress Note, Adult, Complex               Author: Braxtonronniedelmy Xiangmerrick Date & Time created: 3/3/2017  2:36 PM     ID/CC; 82 y/o m admitted for Acute cystitis and TIA.    Interval History:  Pt seen and examined during rounds, no overnight evetns, afebrile, no CP or SOB, no nausea or vomiting.     Review of Systems:  Review of Systems   Constitutional: Negative for chills and weight loss.   Eyes: Negative for double vision and photophobia.   Respiratory: Negative for cough and shortness of breath.    Cardiovascular: Negative for chest pain and palpitations.   Musculoskeletal: Negative for myalgias and neck pain.   Skin: Negative for rash.       Physical Exam:  Physical Exam   Constitutional: He is oriented to person, place, and time.   HENT:   Head: Normocephalic and atraumatic.   Eyes: Conjunctivae are normal. No scleral icterus.   Neck: Neck supple. No JVD present.   Cardiovascular: Normal heart sounds.  Exam reveals no gallop.    No murmur heard.  Pulmonary/Chest: He has no wheezes. He has no rales.   Abdominal: Soft. Bowel sounds are normal. He exhibits no distension. There is no tenderness.   Musculoskeletal: He exhibits no edema.   Neurological: He is alert and oriented to person, place, and time.   Skin: Skin is warm and dry.       Labs:        Invalid input(s): [id number]  Recent Labs      03/02/17   1800   TROPONINI  0.03     Recent Labs      03/02/17   1800  03/03/17 0222   SODIUM  135  138   POTASSIUM  4.0  3.7   CHLORIDE  104  105   CO2  23  24   BUN  16  12   CREATININE  0.97  0.89   CALCIUM  9.6  9.7     Recent Labs      03/02/17   1800  03/03/17 0222   ALTSGPT  13   --    ASTSGOT  18   --    ALKPHOSPHAT  83   --    TBILIRUBIN  0.4   --    GLUCOSE  133*  127*     Recent Labs      03/02/17   1800  03/03/17 0222   RBC  5.20  5.32   HEMOGLOBIN  16.3  16.5   HEMATOCRIT  46.5  47.4   PLATELETCT  330  315   PROTHROMBTM  12.5   --    APTT  32.7   --    INR  0.91   --      Recent Labs       17   1800  17   0222   WBC  9.3  9.3   NEUTSPOLYS  55.40   --    LYMPHOCYTES  27.70   --    MONOCYTES  11.90   --    EOSINOPHILS  3.70   --    BASOPHILS  0.90   --    ASTSGOT  18   --    ALTSGPT  13   --    ALKPHOSPHAT  83   --    TBILIRUBIN  0.4   --            Hemodynamics:  Temp (24hrs), Av.4 °C (97.6 °F), Min:36.1 °C (96.9 °F), Max:36.9 °C (98.4 °F)  Temperature: 36.1 °C (97 °F)  Pulse  Av.5  Min: 67  Max: 99Heart Rate (Monitored): 93  Blood Pressure : 114/74 mmHg, NIBP: (!) 179/119 mmHg     Respiratory:    Respiration: 17, Pulse Oximetry: 92 %        RUL Breath Sounds: Clear, RML Breath Sounds: Clear;Diminished, RLL Breath Sounds: Diminished, JUANITA Breath Sounds: Clear, LLL Breath Sounds: Diminished  Fluids:    Intake/Output Summary (Last 24 hours) at 17 1436  Last data filed at 17 0600   Gross per 24 hour   Intake      0 ml   Output    600 ml   Net   -600 ml     Weight: 114.8 kg (253 lb 1.4 oz)  GI/Nutrition:  Orders Placed This Encounter   Procedures   • DIET ORDER     Standing Status: Standing      Number of Occurrences: 1      Standing Expiration Date:      Order Specific Question:  Diet:     Answer:  Diabetic [3]     Medical Decision Making, by Problem:  Active Hospital Problems    Diagnosis   • *TIA (transient ischemic attack) [G45.9]  -CT head neg  -MRI, Echo and US carotid pending   -cont ASA and statin   • UTI (urinary tract infection) [N39.0]  -Cont ceftriaxone   • HTN (hypertension) [I10]  -cont outpt regimen   • Type 2 diabetes mellitus (HCC) [E11.9]  -SSI and accuchecks    • Dyslipidemia [E78.5]       Labs reviewed, Medications reviewed and Radiology images reviewed  Strong catheter: No Strong      DVT Prophylaxis: Heparin        Assessed for rehab: Patient was assess for and/or received rehabilitation services during this hospitalization

## 2017-03-03 NOTE — THERAPY
"Speech Language Therapy Clinical Swallow Evaluation completed.  Functional Status: The patient was seen for clinical swallow evaluation this date. The patient was awake, alert and oriented x2 however exhibited periods of increased confusion during session. Patient did appear aware of his confusion. The patient was able to follow simple oral motor directives with no gross deficits appreciated. The patient was given PO trials of ice chips, nectars, purees, thin liquids and solids. The patient presented with intermittent audbile swallow on serial sips of thin liquids however, no overt s/s of aspiration were noted. The patient consumed > 8oz of thin liquids with no overt difficulty or concerns for aspiration. The remainder of PO trials were consumed without difficulty. At this time, recommend patient begin regular/thin liquid meals. SLP following x1 to ensure tolerance and follow through with swallow strategies. Please hold Po with any difficulty or change in status. Thank you.     Recommendations - Diet:  Regular                          Strategies: Monitor during meals, Up to chair if possible or Head of Bed at 90 Degrees                          Medication Administration:  Whole with Liquid Wash, Float Whole with Puree (No straws)  Plan of Care: Will benefit from Speech Therapy follow-up    See \"Rehab Therapy-Acute\" Patient Summary Report for complete documentation.   "

## 2017-03-03 NOTE — H&P
CHIEF COMPLAINT:  Generalized weakness.    PRIMARY CARE PHYSICIAN:  BRINA Márquez    ADMITTED TO:  [facility] hospitalist, Dr. Hillman.    DIAGNOSIS:    1.  Acute cystitis without hematuria.   2.  Transient ischemic attack, rule out cerebrovascular accident.    HISTORY OF CURRENT ILLNESS:  The patient is a 81-year-old gentleman who lives   at a skilled nursing facility where today, the patient was having worsening   weakness.  He started to not be able to ambulate.  He also started to have   worsening pyuria and foul smelling urine and the patient thus was sent over to   the emergency room for evaluation.  Here, the patient's urine was sent to the   lab.  The urine at this point does show significant abnormalities with a WBC   count of 20-50 and large leukocyte esterase with multiple bacteria and the   patient at this point will be admitted to the medical floor for IV Rocephin.    We will follow blood cultures and urine cultures.  The patient's mental status   change at this point was transient and may be related to a TIA.  Thus, CVA   will be ruled out.      PAST MEDICAL HISTORY:  Includes diabetes mellitus type 2, hypertension,   peripheral neuropathy, and coronary artery disease.    PAST SURGICAL HISTORY:  None.    ALLERGIES:  No known drug allergies.    MEDICATIONS:  At the time of admission include acetaminophen 650 mg every 6   hours p.r.n. for fever or mild pain, Xanax 0.5 mg 3 times daily p.r.n. for   anxiety, atenolol 50 mg p.o. daily, ciprofloxacin 500 mg p.o. b.i.d., TriCor   145 mg p.o. daily, regular insulin per sliding scale, lisinopril 20 mg p.o.   daily, milk of magnesia 30 mL by mouth p.r.n. for constipation, multivitamin 1   tablet p.o. daily, oxybutynin 5 mg p.o. daily, Pravachol 40 mg p.o. daily,   Synthroid 25 mcg p.o. daily.      SOCIAL HISTORY:  He is a former smoker, 4 packs a day.  He smoked for about 20   years and he quit about 10 years ago, he says, but it is more like 20 years    ago.  He says he quit in 1998.  No drug use.  He has an advanced directive.    He wishes to be DNR code status.    FAMILY HISTORY:  He says he does not know of any pertinent family history.    REVIEW OF SYSTEMS:  He complains of generalized muscle aches, joint aches,   complains of fevers, chills, nausea, vomiting.  Denies any shortness of   breath, any chest pain, does complain of abdominal pain, mostly in the   suprapubic region and also in the right lower quadrant.  Does complain of   severe burning on urination.  All other review of systems were reviewed and   are negative.    PHYSICAL EXAMINATION:  VITAL SIGNS:  Temperature 36.3 degrees Celsius, pulse 99, respirations 17 and   blood pressure 167/105.  He is 114.8 kilograms in weight and he is 188 cm   tall.   GENERAL:  He is currently alert, awake, oriented x3, pleasant, cooperative   gentleman who appears his stated age.  HEENT:  Head is atraumatic.  Eyes follow in normal range of gaze.  Pupils are   equal, round, and reactive bilaterally.  Nose is midline without discharge.    Ears are bilaterally intact without discharge.  Oral cavity, moist mucous   membranes.  No apparent focus infection in the oral cavity.   NECK:  Soft and supple.  No JVD, carotid bruit, thyromegaly, or   lymphadenopathy appreciated.   CHEST:  Chest wall moves equal with inspiration and expiration.  No   paradoxical motion.  No reproducible chest wall tenderness.  HEART:  S1, S2.  No murmurs or gallops detected.  LUNGS:  Diminished breath sounds at the bases.  No rales or rhonchi detected.  ABDOMEN:  Soft.  Bowel sounds present in all 4 quadrants.  No hepatomegaly, no   splenomegaly.  No rebound.  There is tenderness in the right lower quadrant   in the suprapubic region.  GENITAL:  Deferred.   RECTAL:  Deferred.   EXTREMITIES:  Upper and lower extremities, positive pulses, no edema, good   muscle strength, good muscle tone, positive deep tendon reflexes.  NEUROLOGIC:  Cranial nerves  II-XII grossly within normal limits without any   focal deficits appreciated.  SKIN:  He has severe candidal rash in the inguinal region on the right side   and then the abdominal skin fold on the left side.    LABORATORY EVALUATION:  WBC count is 9.3 with a hemoglobin 16.5, hematocrit   47.4, and platelets are 315.  Sodium is 135, potassium 4, chloride 104, CO2 is   23, BUN is 16, creatinine 0.97, and calcium 9.6 with a glucose of 133.  Liver   function tests are within normal limits.  INR is 0.91 with a troponin of   0.03.  Urinalysis shows large leukocyte esterase, wbc's 10-20.  At this point,   chest x-ray which I reviewed myself shows no acute cardiopulmonary process   identified.  There is an early vascular congestion though and CT of the head   was done as well which shows diffuse atrophy, no acute intracranial hemorrhage   or infarct.    IMPRESSION AND PLAN:  At this point:  1.  Acute cystitis without hemmorhage.  Patient will be placed on IV Rocephin.  He   has failed outpatient management with ciprofloxacin.  Patient will need at   least 2 midnights of inpatient stay.  He will need at this point cultures from   the urine and the blood and he will need IV hydration.  2.  Shortness of breath with congestion in the lung.  At this point, may need   some diuresis.  Monitor lung status and given RT protocol and nebulizer   treatments.  3.  For his history of coronary artery disease, continue with beta blocker,   lisinopril and Pravachol.    4.  For his hypothyroidism, continue with Synthroid and check a TSH level.   5.  For his urinary incontinence, continue with Ditropan.   6.  For his diabetes, continue with diabetic management, diabetic diet and   then Accu-Cheks with sliding scale coverage.   7.  For his dyslipidemia, also continue with TriCor.   8.  For his anxiety, continue with Xanax.  Patient at this point is   observation admission to the medical floor.       ____________________________________      MD DEZ MONROE / GRAYSON    DD:  03/03/2017 03:41:16  DT:  03/03/2017 04:48:58    D#:  [id number]  Job#:  [id number]    cc: CARLEE GONSALVES

## 2017-03-03 NOTE — ED NOTES
Spoke w/ Hospitalist about Ramirez need due to persistent urinary incontinence. Pt refusing ramirez insertion, educated on risks of ulcers due to moisture and skin maceration, Pt adamantly refusing.

## 2017-03-04 ENCOUNTER — APPOINTMENT (OUTPATIENT)
Dept: RADIOLOGY | Facility: MEDICAL CENTER | Age: 82
End: 2017-03-04
Attending: HOSPITALIST
Payer: MEDICARE

## 2017-03-04 LAB
BACTERIA UR CULT: NORMAL
GLUCOSE BLD-MCNC: 119 MG/DL (ref 65–99)
GLUCOSE BLD-MCNC: 142 MG/DL (ref 65–99)
GLUCOSE BLD-MCNC: 148 MG/DL (ref 65–99)
GLUCOSE BLD-MCNC: 187 MG/DL (ref 65–99)
SIGNIFICANT IND 70042: NORMAL
SITE SITE: NORMAL
SOURCE SOURCE: NORMAL

## 2017-03-04 PROCEDURE — A9270 NON-COVERED ITEM OR SERVICE: HCPCS | Performed by: HOSPITALIST

## 2017-03-04 PROCEDURE — 700111 HCHG RX REV CODE 636 W/ 250 OVERRIDE (IP): Performed by: HOSPITALIST

## 2017-03-04 PROCEDURE — 700105 HCHG RX REV CODE 258: Performed by: HOSPITALIST

## 2017-03-04 PROCEDURE — 99225 PR SUBSEQUENT OBSERVATION CARE,LEVEL II: CPT | Performed by: INTERNAL MEDICINE

## 2017-03-04 PROCEDURE — 700102 HCHG RX REV CODE 250 W/ 637 OVERRIDE(OP): Performed by: HOSPITALIST

## 2017-03-04 PROCEDURE — 700102 HCHG RX REV CODE 250 W/ 637 OVERRIDE(OP): Performed by: INTERNAL MEDICINE

## 2017-03-04 PROCEDURE — 70551 MRI BRAIN STEM W/O DYE: CPT

## 2017-03-04 PROCEDURE — 96366 THER/PROPH/DIAG IV INF ADDON: CPT

## 2017-03-04 PROCEDURE — G0378 HOSPITAL OBSERVATION PER HR: HCPCS

## 2017-03-04 PROCEDURE — 82962 GLUCOSE BLOOD TEST: CPT | Mod: 91

## 2017-03-04 RX ORDER — MICONAZOLE NITRATE 20 MG/G
CREAM TOPICAL EVERY 6 HOURS
Status: DISCONTINUED | OUTPATIENT
Start: 2017-03-04 | End: 2017-03-08 | Stop reason: HOSPADM

## 2017-03-04 RX ADMIN — NYSTATIN 1500000 UNITS: 100000 POWDER TOPICAL at 17:54

## 2017-03-04 RX ADMIN — THERA TABS 1 TABLET: TAB at 08:18

## 2017-03-04 RX ADMIN — NYSTATIN 1500000 UNITS: 100000 POWDER TOPICAL at 20:08

## 2017-03-04 RX ADMIN — INSULIN LISPRO 2 UNITS: 100 INJECTION, SOLUTION INTRAVENOUS; SUBCUTANEOUS at 20:18

## 2017-03-04 RX ADMIN — LISINOPRIL 20 MG: 20 TABLET ORAL at 20:04

## 2017-03-04 RX ADMIN — ACETAMINOPHEN 650 MG: 325 TABLET, FILM COATED ORAL at 20:42

## 2017-03-04 RX ADMIN — HEPARIN SODIUM 5000 UNITS: 5000 INJECTION, SOLUTION INTRAVENOUS; SUBCUTANEOUS at 20:05

## 2017-03-04 RX ADMIN — Medication 2 TABLET: at 08:17

## 2017-03-04 RX ADMIN — PRAVASTATIN SODIUM 40 MG: 20 TABLET ORAL at 20:04

## 2017-03-04 RX ADMIN — NYSTATIN 1500000 UNITS: 100000 POWDER TOPICAL at 08:19

## 2017-03-04 RX ADMIN — MICONAZOLE NITRATE 1 APPLICATION: 20 CREAM TOPICAL at 17:55

## 2017-03-04 RX ADMIN — CEFTRIAXONE SODIUM 1 G: 1 INJECTION, POWDER, FOR SOLUTION INTRAMUSCULAR; INTRAVENOUS at 20:14

## 2017-03-04 RX ADMIN — FENOFIBRATE 134 MG: 67 CAPSULE ORAL at 08:15

## 2017-03-04 RX ADMIN — LISINOPRIL 20 MG: 20 TABLET ORAL at 08:18

## 2017-03-04 RX ADMIN — HEPARIN SODIUM 5000 UNITS: 5000 INJECTION, SOLUTION INTRAVENOUS; SUBCUTANEOUS at 05:36

## 2017-03-04 RX ADMIN — HEPARIN SODIUM 5000 UNITS: 5000 INJECTION, SOLUTION INTRAVENOUS; SUBCUTANEOUS at 14:31

## 2017-03-04 RX ADMIN — ASPIRIN 324 MG: 81 TABLET, CHEWABLE ORAL at 08:16

## 2017-03-04 RX ADMIN — ATENOLOL 50 MG: 50 TABLET ORAL at 08:17

## 2017-03-04 RX ADMIN — Medication 2 TABLET: at 20:03

## 2017-03-04 ASSESSMENT — ENCOUNTER SYMPTOMS
CHILLS: 0
SHORTNESS OF BREATH: 0
DOUBLE VISION: 0
NECK PAIN: 0
PHOTOPHOBIA: 0
PALPITATIONS: 0
COUGH: 0
MYALGIAS: 0
WEIGHT LOSS: 0
ORTHOPNEA: 0

## 2017-03-04 ASSESSMENT — PAIN SCALES - GENERAL
PAINLEVEL_OUTOF10: 3
PAINLEVEL_OUTOF10: 0
PAINLEVEL_OUTOF10: 4

## 2017-03-04 ASSESSMENT — LIFESTYLE VARIABLES: DO YOU DRINK ALCOHOL: NO

## 2017-03-04 NOTE — PROGRESS NOTES
Hospital Medicine Progress Note, Adult, Complex               Author: Braxtonronniedelmy Plata Date & Time created: 3/4/2017  3:07 PM     ID/CC; 80 y/o m admitted for Acute cystitis and TIA.    Interval History:  No overnight events, denies any chest pain SOB, fever or chills, no nausea or vomiting.      Review of Systems:  Review of Systems   Constitutional: Negative for chills and weight loss.   Eyes: Negative for double vision and photophobia.   Respiratory: Negative for cough and shortness of breath.    Cardiovascular: Negative for chest pain, palpitations and orthopnea.   Musculoskeletal: Negative for myalgias and neck pain.   Skin: Negative for rash.       Physical Exam:  Physical Exam   Constitutional: He is oriented to person, place, and time.   HENT:   Head: Normocephalic and atraumatic.   Eyes: Conjunctivae are normal. No scleral icterus.   Neck: Neck supple. No JVD present.   Cardiovascular: Normal heart sounds.  Exam reveals no gallop.    No murmur heard.  Pulmonary/Chest: He has no wheezes. He has no rales.   Abdominal: Soft. Bowel sounds are normal. He exhibits no distension. There is no tenderness. There is no rebound.   Musculoskeletal: He exhibits no edema.   Neurological: He is alert and oriented to person, place, and time.   Skin: Skin is warm and dry.   Nursing note and vitals reviewed.      Labs:        Invalid input(s): [id number]  Recent Labs      03/02/17   1800   TROPONINI  0.03     Recent Labs      03/02/17   1800  03/03/17 0222   SODIUM  135  138   POTASSIUM  4.0  3.7   CHLORIDE  104  105   CO2  23  24   BUN  16  12   CREATININE  0.97  0.89   CALCIUM  9.6  9.7     Recent Labs      03/02/17   1800  03/03/17 0222   ALTSGPT  13   --    ASTSGOT  18   --    ALKPHOSPHAT  83   --    TBILIRUBIN  0.4   --    GLUCOSE  133*  127*     Recent Labs      03/02/17   1800  03/03/17 0222   RBC  5.20  5.32   HEMOGLOBIN  16.3  16.5   HEMATOCRIT  46.5  47.4   PLATELETCT  330  315   PROTHROMBTM  12.5   --    APTT   32.7   --    INR  0.91   --      Recent Labs      17   1800  17   0222   WBC  9.3  9.3   NEUTSPOLYS  55.40   --    LYMPHOCYTES  27.70   --    MONOCYTES  11.90   --    EOSINOPHILS  3.70   --    BASOPHILS  0.90   --    ASTSGOT  18   --    ALTSGPT  13   --    ALKPHOSPHAT  83   --    TBILIRUBIN  0.4   --            Hemodynamics:  Temp (24hrs), Av.5 °C (97.7 °F), Min:36.2 °C (97.2 °F), Max:36.8 °C (98.3 °F)  Temperature: 36.8 °C (98.3 °F)  Pulse  Av.8  Min: 67  Max: 99   Blood Pressure : 145/88 mmHg     Respiratory:    Respiration: 16, Pulse Oximetry: 96 %        RUL Breath Sounds: Clear, RML Breath Sounds: Diminished, RLL Breath Sounds: Diminished, JUANITA Breath Sounds: Clear, LLL Breath Sounds: Diminished  Fluids:    Intake/Output Summary (Last 24 hours) at 17 1507  Last data filed at 17 0700   Gross per 24 hour   Intake   1830 ml   Output   1625 ml   Net    205 ml        GI/Nutrition:  Orders Placed This Encounter   Procedures   • DIET ORDER     Standing Status: Standing      Number of Occurrences: 1      Standing Expiration Date:      Order Specific Question:  Diet:     Answer:  Diabetic [3]     Medical Decision Making, by Problem:  Active Hospital Problems    Diagnosis   • *TIA (transient ischemic attack) [G45.9]  -CT head neg  -Echo and US carotid neg.   -cont ASA and statin  -MRI pending    • UTI (urinary tract infection) [N39.0]  -Cont ceftriaxone   • HTN (hypertension) [I10]  -cont outpt regimen   • Type 2 diabetes mellitus (HCC) [E11.9]  -SSI and accuchecks    • Dyslipidemia [E78.5]       Labs reviewed, Medications reviewed and Radiology images reviewed  Strong catheter: No Strong      DVT Prophylaxis: Heparin        Assessed for rehab: Patient was assess for and/or received rehabilitation services during this hospitalization

## 2017-03-04 NOTE — WOUND TEAM
"[facility]  Inpatient Services  Initial Wound and Skin Care Evaluation    Admission Date:   03/02/2017  HPI, PMH, SH: Reviewed  Unit where seen by Wound Team: Neurosciences.    WOUND CONSULT RELATED TO: Buttocks and left ankle.    SUBJECTIVE:  \"They've been putting medicine on it.\"    Self Report / Pain Level: 4/10    OBJECTIVE: Agreeable.    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        Periwound:     Satellite lesions.  Drainage:     Scant, yellow on left groin.  Tissue Type and %:    100% red.  Wound Edges:    Attached.  Odor:      None.  Exposed structure(s):  None.  S&S of Infection:   Satellite lesions and yellow discharge both indicate a yeast infection.       Measurements:   03/04/2017.  Length:     20 cm  Width:      15 cm  Depth:    0 cm      INTERVENTIONS BY WOUND TEAM: Patient able to turn with minimal assistance. Buttocks assessed and IAD identified. Bilateral groin affected also. Miconazole with zinc antifungal cream ordered for buttocks and groin because it stays in place better than the nystatin powder on these areas.       Interdisciplinary consultation: Patient and nursing.     EVALUATION: Antifungal cream will treat yeast infection, zinc will protect skin from incontinence.     Factors affecting wound healing: Infection, moisture.   Goals: IAD will decrease in size by 1% per week.     NURSING PLAN OF CARE ORDERS (X):    Dressing changes: See Dressing Maintenance orders:   Skin care: See Skin Care orders: X  Rectal tube care: See Rectal Tube Care orders:   Other orders:    RSKIN: CURRENT (X) ORDERED (O)  Q shift Chad:  X  Q shift pressure point assessments:  X  Pressure redistribution mattress    X    SHIELA      Bariatric SHIELA      Bariatric foam        Heel float " boots       Heels floated on pillows      Barrier wipes      Barrier Cream      Barrier paste      Sacral silicone dressing      Silicone O2 tubing      Anchorfast      Trach with Optifoam split foam       Waffle cushion      Rectal tube or BMS      Antifungal tx  X  Turn q 2 hours     Up to chair     Ambulate   PT/OT     Dietician      PO   X  TF   TPN     PVN    NPO   # days   Other       WOUND TEAM PLAN OF CARE (X):   NPWT change 3 x week:        Dressing changes by wound team:       Follow up as needed:   X    Other (explain):    Anticipated discharge plans (X):  SNF:           Home Care:           Outpatient Wound Center:            Self Care:  X          Other:

## 2017-03-04 NOTE — PROGRESS NOTES
AAOx3, with disorientation to time. SOLOMON, LLE weakness present. Tele monitoring in place. Pt inc of urine, condom cath in place. Redness to bijan area and inguinal folds noted.

## 2017-03-04 NOTE — CARE PLAN
Problem: Communication  Goal: The ability to communicate needs accurately and effectively will improve  Outcome: PROGRESSING AS EXPECTED  Pt oriented to call light and instructed to call for assistance     Problem: Urinary Elimination:  Goal: Ability to reestablish a normal urinary elimination pattern will improve  Outcome: PROGRESSING AS EXPECTED  Condom cath in place for urinary incontinence     Problem: Urinary:  Goal: Ability to reestablish a normal urinary elimination pattern will improve  Outcome: PROGRESSING AS EXPECTED  Condom cath in place for urinary incontinence

## 2017-03-05 ENCOUNTER — HOSPITAL ENCOUNTER (INPATIENT)
Facility: REHABILITATION | Age: 82
End: 2017-03-05
Attending: PHYSICAL MEDICINE & REHABILITATION | Admitting: PHYSICAL MEDICINE & REHABILITATION
Payer: MEDICARE

## 2017-03-05 LAB
GLUCOSE BLD-MCNC: 136 MG/DL (ref 65–99)
GLUCOSE BLD-MCNC: 141 MG/DL (ref 65–99)
GLUCOSE BLD-MCNC: 181 MG/DL (ref 65–99)
GLUCOSE BLD-MCNC: 213 MG/DL (ref 65–99)

## 2017-03-05 PROCEDURE — 96366 THER/PROPH/DIAG IV INF ADDON: CPT

## 2017-03-05 PROCEDURE — G0378 HOSPITAL OBSERVATION PER HR: HCPCS

## 2017-03-05 PROCEDURE — 700111 HCHG RX REV CODE 636 W/ 250 OVERRIDE (IP): Performed by: HOSPITALIST

## 2017-03-05 PROCEDURE — 700102 HCHG RX REV CODE 250 W/ 637 OVERRIDE(OP): Performed by: HOSPITALIST

## 2017-03-05 PROCEDURE — 99224 PR SUBSEQUENT OBSERVATION CARE,LEVEL I: CPT | Performed by: INTERNAL MEDICINE

## 2017-03-05 PROCEDURE — A9270 NON-COVERED ITEM OR SERVICE: HCPCS | Performed by: HOSPITALIST

## 2017-03-05 PROCEDURE — 82962 GLUCOSE BLOOD TEST: CPT

## 2017-03-05 PROCEDURE — 700105 HCHG RX REV CODE 258: Performed by: HOSPITALIST

## 2017-03-05 RX ADMIN — FENOFIBRATE 134 MG: 67 CAPSULE ORAL at 08:51

## 2017-03-05 RX ADMIN — INSULIN LISPRO 2 UNITS: 100 INJECTION, SOLUTION INTRAVENOUS; SUBCUTANEOUS at 20:16

## 2017-03-05 RX ADMIN — THERA TABS 1 TABLET: TAB at 08:51

## 2017-03-05 RX ADMIN — Medication 2 TABLET: at 20:00

## 2017-03-05 RX ADMIN — LISINOPRIL 20 MG: 20 TABLET ORAL at 20:00

## 2017-03-05 RX ADMIN — MICONAZOLE NITRATE: 20 CREAM TOPICAL at 01:13

## 2017-03-05 RX ADMIN — HEPARIN SODIUM 5000 UNITS: 5000 INJECTION, SOLUTION INTRAVENOUS; SUBCUTANEOUS at 20:03

## 2017-03-05 RX ADMIN — HEPARIN SODIUM 5000 UNITS: 5000 INJECTION, SOLUTION INTRAVENOUS; SUBCUTANEOUS at 14:45

## 2017-03-05 RX ADMIN — NYSTATIN 1500000 UNITS: 100000 POWDER TOPICAL at 20:14

## 2017-03-05 RX ADMIN — NYSTATIN 1500000 UNITS: 100000 POWDER TOPICAL at 08:50

## 2017-03-05 RX ADMIN — MICONAZOLE NITRATE: 20 CREAM TOPICAL at 06:05

## 2017-03-05 RX ADMIN — HEPARIN SODIUM 5000 UNITS: 5000 INJECTION, SOLUTION INTRAVENOUS; SUBCUTANEOUS at 06:03

## 2017-03-05 RX ADMIN — ASPIRIN 325 MG: 325 TABLET, COATED ORAL at 08:51

## 2017-03-05 RX ADMIN — MICONAZOLE NITRATE: 20 CREAM TOPICAL at 17:47

## 2017-03-05 RX ADMIN — INSULIN LISPRO 3 UNITS: 100 INJECTION, SOLUTION INTRAVENOUS; SUBCUTANEOUS at 17:07

## 2017-03-05 RX ADMIN — CEFTRIAXONE SODIUM 1 G: 1 INJECTION, POWDER, FOR SOLUTION INTRAMUSCULAR; INTRAVENOUS at 20:14

## 2017-03-05 RX ADMIN — MICONAZOLE NITRATE: 20 CREAM TOPICAL at 11:51

## 2017-03-05 RX ADMIN — Medication 2 TABLET: at 08:50

## 2017-03-05 RX ADMIN — LISINOPRIL 20 MG: 20 TABLET ORAL at 08:50

## 2017-03-05 RX ADMIN — NYSTATIN 1500000 UNITS: 100000 POWDER TOPICAL at 14:45

## 2017-03-05 RX ADMIN — MICONAZOLE NITRATE: 20 CREAM TOPICAL at 23:59

## 2017-03-05 RX ADMIN — PRAVASTATIN SODIUM 40 MG: 20 TABLET ORAL at 20:01

## 2017-03-05 RX ADMIN — ATENOLOL 50 MG: 50 TABLET ORAL at 08:50

## 2017-03-05 ASSESSMENT — ENCOUNTER SYMPTOMS
ORTHOPNEA: 0
PALPITATIONS: 0
SHORTNESS OF BREATH: 0
NECK PAIN: 0
MYALGIAS: 0
PHOTOPHOBIA: 0
COUGH: 0
WEIGHT LOSS: 0
DOUBLE VISION: 0
CHILLS: 0

## 2017-03-05 ASSESSMENT — PAIN SCALES - GENERAL
PAINLEVEL_OUTOF10: 0

## 2017-03-05 NOTE — DISCHARGE PLANNING
PMR review:     Active Hospital Problems      Diagnosis    •  *TIA (transient ischemic attack) [G45.9]  -CT head neg  -Echo and US carotid neg.    -cont ASA and statin  -MRI pending     •  UTI (urinary tract infection) [N39.0]  -Cont ceftriaxone    •  HTN (hypertension) [I10]  -cont outpt regimen    •  Type 2 diabetes mellitus (HCC) [E11.9]  -SSI and accuchecks     •  Dyslipidemia [E78.5]              MRI:  Impression        1.  Advanced cerebral atrophy.  2.  Advanced supratentorial white matter disease most consistent with microvascular ischemic change.  3.  Microvascular ischemic changes in the chuy.  4.  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  5.  No evidence of acute cerebral infarction, acute hemorrhage, or mass lesion         Mary Irvin P.T. Physical Therapist Signed  Therapy 3/3/2017  2:13 PM      Expand All Collapse All    Physical Therapy Treatment completed.    Bed Mobility:  Supine to Sit: Minimal Assist (strong cues needed for log roll. )  Transfers: Sit to Stand: Minimal Assist  Gait: Level Of Assist: Unable to Participate with Front-Wheel Walker, unable to advance R LE, and able to move L LE slightly.       Plan of Care: Will benefit from Physical Therapy 3 times per week  Discharge Recommendations: Equipment: Will Continue to Assess for Equipment Needs. Post-acute therapy recommended before discharged home.           Celine Anand O.T. Occupational Therapist Signed  Therapy 3/3/2017  2:12 PM      Expand All Collapse All    Occupational Therapy Evaluation completed.   Functional Status:  Pt presenting to skilled OT services with significant decline with ADLs, impaired balance, decreased activity tolerance, and cognition. Performed supine<>sit with min/mod a and vc's for techniques and proper hand placement, STS from eob with min a using FWW, donning/doffing [facility] gown with min a, toileting max a, LB dressing max a, had  multiple posterior lob and required min a to correct, in standing unable to wt-shift.  Pt would benefit from acute and post acute skilled OT services prior to d/c home  Plan of Care: Will benefit from Occupational Therapy 3 times per week  Discharge Recommendations:  Equipment: Will Continue to Assess for Equipment Needs. Post-acute therapy recommended before discharged home.        ENRIQUE Burris. Speech Language Pathologist Signed  Therapy 3/3/2017 10:19 AM      Expand All Collapse All    Speech Language Therapy Clinical Swallow Evaluation completed.  Functional Status: The patient was seen for clinical swallow evaluation this date. The patient was awake, alert and oriented x2 however exhibited periods of increased confusion during session. Patient did appear aware of his confusion. The patient was able to follow simple oral motor directives with no gross deficits appreciated. The patient was given PO trials of ice chips, nectars, purees, thin liquids and solids. The patient presented with intermittent audbile swallow on serial sips of thin liquids however, no overt s/s of aspiration were noted. The patient consumed > 8oz of thin liquids with no overt difficulty or concerns for aspiration. The remainder of PO trials were consumed without difficulty. At this time, recommend patient begin regular/thin liquid meals. SLP following x1 to ensure tolerance and follow through with swallow strategies. Please hold Po with any difficulty or change in status. Thank you.      Recommendations - Diet:  Regular                          Strategies: Monitor during meals, Up to chair if possible or Head of Bed at 90 Degrees                          Medication Administration:  Whole with Liquid Wash, Float Whole with Puree (No straws)  Plan of Care: Will benefit from Speech Therapy follow-up        Current documentation indicates pt does have PT/OT therapy needs. Will forward to Physiatry for consult per protocol.

## 2017-03-05 NOTE — WOUND TEAM
"In to see pt for wound care POA bilateral groin, buttocks and L ankle. Pt let staff view buttocks since he was turned to L side. There is evidence of satellite lesions, antifungal juan j in use, nystatin powder in use for groin area partial thickness moisture associated skin damage. Unable to view L ankle wound, pt refused to turn so it could be seen. \"It's nothing, leave me alone.\" As noted appropriate interventions in place for wounds that could be viewed. Pt has no advanced wound care needs @ this time. If pt agreeable please re-consult for ankle wound.   "

## 2017-03-05 NOTE — PROGRESS NOTES
"Pt ripped IV out of arm. Pt states that he \"does not want any more medical treatment.\" Refusing to allow this nurse to replace the IV. States he does not want to take any medication.   "

## 2017-03-05 NOTE — CARE PLAN
Problem: Safety  Goal: Will remain free from falls  Outcome: PROGRESSING AS EXPECTED  Bed alarm on, fall precautions in place     Problem: Mobility  Goal: Risk for activity intolerance will decrease  Outcome: PROGRESSING AS EXPECTED  x2 assist to help pt stand up with FWW

## 2017-03-05 NOTE — PROGRESS NOTES
Hospital Medicine Progress Note, Adult, Complex               Author: Ferny Plata Date & Time created: 3/5/2017  3:20 PM     ID/CC; 82 y/o m admitted for Acute cystitis and TIA.    Interval History:  Pt seen and examined , denies any acute complains at this time.     Review of Systems:  Review of Systems   Constitutional: Negative for chills and weight loss.   Eyes: Negative for double vision and photophobia.   Respiratory: Negative for cough and shortness of breath.    Cardiovascular: Negative for chest pain, palpitations and orthopnea.   Musculoskeletal: Negative for myalgias and neck pain.   Skin: Negative for rash.       Physical Exam:  Physical Exam   Constitutional: He is oriented to person, place, and time.   HENT:   Head: Normocephalic and atraumatic.   Eyes: Conjunctivae are normal. No scleral icterus.   Neck: Neck supple. No JVD present.   Cardiovascular: Normal heart sounds.  Exam reveals no gallop.    No murmur heard.  Pulmonary/Chest: He has no wheezes. He has no rales.   Abdominal: Soft. Bowel sounds are normal. He exhibits no distension. There is no tenderness. There is no rebound.   Musculoskeletal: He exhibits no edema.   Neurological: He is alert and oriented to person, place, and time.   Skin: Skin is warm and dry.   Nursing note and vitals reviewed.      Labs:        Invalid input(s): [id number]  Recent Labs      03/02/17   1800   TROPONINI  0.03     Recent Labs      03/02/17   1800  03/03/17   0222   SODIUM  135  138   POTASSIUM  4.0  3.7   CHLORIDE  104  105   CO2  23  24   BUN  16  12   CREATININE  0.97  0.89   CALCIUM  9.6  9.7     Recent Labs      03/02/17   1800  03/03/17   0222   ALTSGPT  13   --    ASTSGOT  18   --    ALKPHOSPHAT  83   --    TBILIRUBIN  0.4   --    GLUCOSE  133*  127*     Recent Labs      03/02/17   1800  03/03/17 0222   RBC  5.20  5.32   HEMOGLOBIN  16.3  16.5   HEMATOCRIT  46.5  47.4   PLATELETCT  330  315   PROTHROMBTM  12.5   --    APTT  32.7   --    INR  0.91    --      Recent Labs      17   1800  17   0222   WBC  9.3  9.3   NEUTSPOLYS  55.40   --    LYMPHOCYTES  27.70   --    MONOCYTES  11.90   --    EOSINOPHILS  3.70   --    BASOPHILS  0.90   --    ASTSGOT  18   --    ALTSGPT  13   --    ALKPHOSPHAT  83   --    TBILIRUBIN  0.4   --            Hemodynamics:  Temp (24hrs), Av.7 °C (98.1 °F), Min:36.4 °C (97.6 °F), Max:37.1 °C (98.7 °F)  Temperature: 36.6 °C (97.9 °F)  Pulse  Av.6  Min: 67  Max: 99   Blood Pressure : 126/78 mmHg     Respiratory:    Respiration: 16, Pulse Oximetry: 93 %        RUL Breath Sounds: Clear, RML Breath Sounds: Diminished, RLL Breath Sounds: Diminished, JUANITA Breath Sounds: Clear, LLL Breath Sounds: Diminished  Fluids:    Intake/Output Summary (Last 24 hours) at 17 1520  Last data filed at 17 1500   Gross per 24 hour   Intake   1000 ml   Output   1400 ml   Net   -400 ml        GI/Nutrition:  Orders Placed This Encounter   Procedures   • DIET ORDER     Standing Status: Standing      Number of Occurrences: 1      Standing Expiration Date:      Order Specific Question:  Diet:     Answer:  Diabetic [3]     Medical Decision Making, by Problem:  Active Hospital Problems    Diagnosis   • *TIA (transient ischemic attack) [G45.9]  -CT head neg  -Echo and US carotid neg.   -cont ASA and statin  -MRI neg for acute issues    • UTI (urinary tract infection) [N39.0]  -Cont ceftriaxone   • HTN (hypertension) [I10]  -cont outpt regimen   • Type 2 diabetes mellitus (HCC) [E11.9]  -SSI and accuchecks    • Dyslipidemia [E78.5]     PT/OT eval needs placement rehab vs SNF    Labs reviewed, Medications reviewed and Radiology images reviewed  Strong catheter: No Strong      DVT Prophylaxis: Heparin        Assessed for rehab: Patient was assess for and/or received rehabilitation services during this hospitalization

## 2017-03-05 NOTE — PROGRESS NOTES
AAOx3 at this time however orientation seems to vary. At times pt becomes confused to place and event. SOLOMON, LLE weakness present. 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 Tele monitoring in place. Pt inc of urine, condom cath in place. Redness to bijan area and inguinal folds noted. Bed alarm on and call light in reach.

## 2017-03-05 NOTE — PROGRESS NOTES
Monitor Summary: SR 67-77, NM 0.26, QRS 0.16, QT 0.4, DC's tele at 0000 per strip from monitor room

## 2017-03-05 NOTE — DISCHARGE PLANNING
Aware of Physiatry consult from Dr. Plata. Dr. Robles to consult. Pt has Medicare [employer] insurance. If pt appropriate for IRF, will need to seek authorization from [facility] for rehab. TCC to follow for Physiatry recommendation.

## 2017-03-05 NOTE — PROGRESS NOTES
Patient up to bedside commode with 2 person assist. Pt. very unsteady on his feet and needs guidance due to his legally blind status. Pt returned to bed. Bed in lowest position, call light within reach, treaded socks on both feet, bed alarm armed. Will continue to monitor.

## 2017-03-05 NOTE — PROGRESS NOTES
Pt very irritable and refusing telemonitor at this time. Educated pt on need for tele monitor however pt is complaining about interruptions in his sleep to fix the leads. Pt refusing to allow nurse to replace telemonitor leads. Hospitalist updated. Will follow up for need for tele with day shift.

## 2017-03-06 LAB
ANION GAP SERPL CALC-SCNC: 7 MMOL/L (ref 0–11.9)
BUN SERPL-MCNC: 16 MG/DL (ref 8–22)
CALCIUM SERPL-MCNC: 9.4 MG/DL (ref 8.5–10.5)
CHLORIDE SERPL-SCNC: 110 MMOL/L (ref 96–112)
CO2 SERPL-SCNC: 22 MMOL/L (ref 20–33)
CREAT SERPL-MCNC: 0.89 MG/DL (ref 0.5–1.4)
ERYTHROCYTE [DISTWIDTH] IN BLOOD BY AUTOMATED COUNT: 43.6 FL (ref 35.9–50)
GFR SERPL CREATININE-BSD FRML MDRD: >60 ML/MIN/1.73 M 2
GLUCOSE BLD-MCNC: 114 MG/DL (ref 65–99)
GLUCOSE BLD-MCNC: 148 MG/DL (ref 65–99)
GLUCOSE BLD-MCNC: 178 MG/DL (ref 65–99)
GLUCOSE BLD-MCNC: 199 MG/DL (ref 65–99)
GLUCOSE SERPL-MCNC: 126 MG/DL (ref 65–99)
HCT VFR BLD AUTO: 47.6 % (ref 42–52)
HGB BLD-MCNC: 16.2 G/DL (ref 14–18)
MCH RBC QN AUTO: 31 PG (ref 27–33)
MCHC RBC AUTO-ENTMCNC: 34 G/DL (ref 33.7–35.3)
MCV RBC AUTO: 91 FL (ref 81.4–97.8)
PLATELET # BLD AUTO: 295 K/UL (ref 164–446)
PMV BLD AUTO: 9.1 FL (ref 9–12.9)
POTASSIUM SERPL-SCNC: 3.9 MMOL/L (ref 3.6–5.5)
RBC # BLD AUTO: 5.23 M/UL (ref 4.7–6.1)
SODIUM SERPL-SCNC: 139 MMOL/L (ref 135–145)
WBC # BLD AUTO: 10.3 K/UL (ref 4.8–10.8)

## 2017-03-06 PROCEDURE — 700105 HCHG RX REV CODE 258: Performed by: HOSPITALIST

## 2017-03-06 PROCEDURE — 700111 HCHG RX REV CODE 636 W/ 250 OVERRIDE (IP): Performed by: INTERNAL MEDICINE

## 2017-03-06 PROCEDURE — 700105 HCHG RX REV CODE 258: Performed by: INTERNAL MEDICINE

## 2017-03-06 PROCEDURE — 80048 BASIC METABOLIC PNL TOTAL CA: CPT

## 2017-03-06 PROCEDURE — 36415 COLL VENOUS BLD VENIPUNCTURE: CPT

## 2017-03-06 PROCEDURE — G0378 HOSPITAL OBSERVATION PER HR: HCPCS

## 2017-03-06 PROCEDURE — 82962 GLUCOSE BLOOD TEST: CPT

## 2017-03-06 PROCEDURE — 99224 PR SUBSEQUENT OBSERVATION CARE,LEVEL I: CPT | Performed by: INTERNAL MEDICINE

## 2017-03-06 PROCEDURE — 96366 THER/PROPH/DIAG IV INF ADDON: CPT

## 2017-03-06 PROCEDURE — 700111 HCHG RX REV CODE 636 W/ 250 OVERRIDE (IP): Performed by: HOSPITALIST

## 2017-03-06 PROCEDURE — 85027 COMPLETE CBC AUTOMATED: CPT

## 2017-03-06 PROCEDURE — 700102 HCHG RX REV CODE 250 W/ 637 OVERRIDE(OP): Performed by: HOSPITALIST

## 2017-03-06 PROCEDURE — A9270 NON-COVERED ITEM OR SERVICE: HCPCS | Performed by: HOSPITALIST

## 2017-03-06 RX ADMIN — NYSTATIN 1500000 UNITS: 100000 POWDER TOPICAL at 15:47

## 2017-03-06 RX ADMIN — LISINOPRIL 20 MG: 20 TABLET ORAL at 21:21

## 2017-03-06 RX ADMIN — MICONAZOLE NITRATE: 20 CREAM TOPICAL at 05:07

## 2017-03-06 RX ADMIN — MICONAZOLE NITRATE: 20 CREAM TOPICAL at 12:00

## 2017-03-06 RX ADMIN — NYSTATIN 1500000 UNITS: 100000 POWDER TOPICAL at 07:55

## 2017-03-06 RX ADMIN — FENOFIBRATE 134 MG: 67 CAPSULE ORAL at 07:54

## 2017-03-06 RX ADMIN — LISINOPRIL 20 MG: 20 TABLET ORAL at 07:55

## 2017-03-06 RX ADMIN — HEPARIN SODIUM 5000 UNITS: 5000 INJECTION, SOLUTION INTRAVENOUS; SUBCUTANEOUS at 21:20

## 2017-03-06 RX ADMIN — PRAVASTATIN SODIUM 40 MG: 20 TABLET ORAL at 21:27

## 2017-03-06 RX ADMIN — ATENOLOL 50 MG: 50 TABLET ORAL at 07:54

## 2017-03-06 RX ADMIN — HEPARIN SODIUM 5000 UNITS: 5000 INJECTION, SOLUTION INTRAVENOUS; SUBCUTANEOUS at 05:02

## 2017-03-06 RX ADMIN — Medication 2 TABLET: at 07:55

## 2017-03-06 RX ADMIN — INSULIN LISPRO 2 UNITS: 100 INJECTION, SOLUTION INTRAVENOUS; SUBCUTANEOUS at 21:25

## 2017-03-06 RX ADMIN — THERA TABS 1 TABLET: TAB at 07:55

## 2017-03-06 RX ADMIN — HEPARIN SODIUM 5000 UNITS: 5000 INJECTION, SOLUTION INTRAVENOUS; SUBCUTANEOUS at 15:04

## 2017-03-06 RX ADMIN — SODIUM CHLORIDE: 9 INJECTION, SOLUTION INTRAVENOUS at 16:16

## 2017-03-06 RX ADMIN — NYSTATIN 1500000 UNITS: 100000 POWDER TOPICAL at 21:33

## 2017-03-06 RX ADMIN — ASPIRIN 325 MG: 325 TABLET, COATED ORAL at 07:54

## 2017-03-06 RX ADMIN — INSULIN LISPRO 2 UNITS: 100 INJECTION, SOLUTION INTRAVENOUS; SUBCUTANEOUS at 16:53

## 2017-03-06 RX ADMIN — CEFTRIAXONE SODIUM 1 G: 1 INJECTION, POWDER, FOR SOLUTION INTRAMUSCULAR; INTRAVENOUS at 21:25

## 2017-03-06 ASSESSMENT — ENCOUNTER SYMPTOMS
PALPITATIONS: 0
ORTHOPNEA: 0
COUGH: 0
DOUBLE VISION: 0
WEIGHT LOSS: 0
SHORTNESS OF BREATH: 0
CHILLS: 0
NECK PAIN: 0
PHOTOPHOBIA: 0
MYALGIAS: 0

## 2017-03-06 ASSESSMENT — PAIN SCALES - GENERAL
PAINLEVEL_OUTOF10: 2
PAINLEVEL_OUTOF10: 0

## 2017-03-06 NOTE — CONSULTS
DATE OF SERVICE:  03/05/2017    Dear Dr. Plata:    Thank you for this consultation.    PROBLEM LIST:  1.  Transient ischemic attack versus a rind with current debility and   deconditioning as well as ADL and mobility deficits.  2.  Urinary tract infection.  3.  Hypertension.  4.  Diabetes mellitus.  5.  Dyslipidemia.    HISTORY OF PRESENT ILLNESS:  The patient is a very pleasant 81-year-old   gentleman, who was admitted to [facility] with mental status   changes as well as inability to ambulate.  He was evaluated at the emergency   room.  Urine showed abnormalities and was treated for acute cystitis with   hematuria as well as he was worked up for a cerebrovascular accident.  CT scan   was within normal limits.    PAST MEDICAL HISTORY:  Significant for diabetes, hypertension, peripheral   neuropathy as well as coronary artery disease.    ALLERGIES:  No known drug allergies.    CURRENT MEDICATIONS:  Aspirin 325 mg p.o. every day, Tenormin 50 mg p.o. every   day, Rocephin 1 g IV q. 24 hour, Lofibra 134 mg every day, heparin 5000 units   subQ q. 8 hours, Humalog sliding scale, Synthroid 20 mg b.i.d., Uri topical   cream q. 6 hours, some multivitamin with iron 1 p.o. every day, nystatin 3   times a day, Pravachol 40 mg p.o. every day, Senokot 1 a day.    SOCIAL HISTORY:  The patient lives alone in one-level home with 1 step to the   entrance.  Former smoker, quit years ago.  Denies any tobacco or alcohol use.    FAMILY HISTORY:  Significant for hypertension as well as coronary artery   disease.    REVIEW OF SYSTEMS:  Negative for AMA and CMS guidelines.    PHYSICAL EXAMINATION:  GENERAL:  He is a well-developed, well-nourished gentleman in no apparent   distress.  NEUROLOGIC:  Cranial nerves II-XII are grossly intact bilaterally and   symmetrical.  He is alert and oriented to times person and place.  He has a   hard time with date.  Upper extremity strength is approximately 5/5.  He is a   little bit off on  the right, not too bad though.  As for the lower   extremities, a little more discoordination secondary to possibly his   peripheral neuropathy hemodynamically.  NECK:  No lymphadenopathy.  LUNGS:  Poor inspiratory effort.  HEART:  Regular rate.  ABDOMEN:  Positive bowel sounds, soft, and nontender.  GENITOURINARY AND RECTAL:  Deferred.  EXTREMITIES:  No clubbing, cyanosis and no calf tenderness.  Sensation is   intact with decreased sensation about 4-5 inches below his knees distally and   deep tendon reflexes are 1+ at biceps, triceps, patella is trace and ankle   jerks are absent and toes are equivocal bilaterally.    ASSESSMENT:  1.  Mobility and activities of daily living deficits secondary to bladder   infection.  2.  Possible rind with some neurologic issues at the beginning, clearing up   well.  3.  Peripheral vascular disease, also complicating his activities of daily   living.  4.  Diabetes.  5.  Dyslipidemia.  6.  Anxiety.    PLAN:  To continue with physical therapy and occupational therapy.  He does   live alone and at this time may be _____ to skilled nursing facility to   further address these functional mobility and ADL deficits.  However, he has   shown good participation.  We can consider acute inpatient rehabilitation of   the rehab group.  We will follow him this weekend, look at the best options   for the patient.       ____________________________________     M MD RICH FLOYD / GRAYSON    DD:  03/06/2017 08:15:59  DT:  03/06/2017 10:13:39    D#:  [id number]  Job#:  [id number]

## 2017-03-06 NOTE — PROGRESS NOTES
Patient alert and oriented to name, place, and event but not time. Patient complaining that the co-band is making his right arm itch. Co-band removed and replaced. Demonstrates and verbalizes comfort. Will continue to monitor

## 2017-03-06 NOTE — PROGRESS NOTES
Hospital Medicine Progress Note, Adult, Complex               Author: Ferny Plata Date & Time created: 3/6/2017  3:43 PM     ID/CC; 80 y/o m admitted for Acute cystitis and TIA.    Interval History:  No overnight events afebrile no nausea or vomiting. No acute complains at this time.      Review of Systems:  Review of Systems   Constitutional: Negative for chills and weight loss.   Eyes: Negative for double vision and photophobia.   Respiratory: Negative for cough and shortness of breath.    Cardiovascular: Negative for chest pain, palpitations and orthopnea.   Musculoskeletal: Negative for myalgias and neck pain.   Skin: Negative for rash.       Physical Exam:  Physical Exam   Constitutional: He is oriented to person, place, and time.   HENT:   Head: Normocephalic and atraumatic.   Eyes: Conjunctivae are normal. No scleral icterus.   Neck: Neck supple. No JVD present.   Cardiovascular: Normal heart sounds.  Exam reveals no gallop.    No murmur heard.  Pulmonary/Chest: He has no wheezes. He has no rales.   Abdominal: Soft. Bowel sounds are normal. He exhibits no distension. There is no tenderness. There is no rebound.   Musculoskeletal: He exhibits no edema.   Neurological: He is alert and oriented to person, place, and time.   Skin: Skin is warm and dry.   Nursing note and vitals reviewed.      Labs:        Invalid input(s): [id number]      Recent Labs      17   0549   SODIUM  139   POTASSIUM  3.9   CHLORIDE  110   CO2  22   BUN  16   CREATININE  0.89   CALCIUM  9.4     Recent Labs      17   0549   GLUCOSE  126*     Recent Labs      17   0550   RBC  5.23   HEMOGLOBIN  16.2   HEMATOCRIT  47.6   PLATELETCT  295     Recent Labs      17   0550   WBC  10.3           Hemodynamics:  Temp (24hrs), Av.5 °C (97.7 °F), Min:36.3 °C (97.4 °F), Max:36.7 °C (98.1 °F)  Temperature: 36.3 °C (97.4 °F)  Pulse  Av.3  Min: 64  Max: 99   Blood Pressure : 139/85 mmHg     Respiratory:    Respiration: 16,  Pulse Oximetry: 95 %        RUL Breath Sounds: Clear, RML Breath Sounds: Diminished, RLL Breath Sounds: Diminished, JUANITA Breath Sounds: Clear, LLL Breath Sounds: Diminished  Fluids:    Intake/Output Summary (Last 24 hours) at 03/06/17 1543  Last data filed at 03/06/17 0600   Gross per 24 hour   Intake   1000 ml   Output    900 ml   Net    100 ml        GI/Nutrition:  Orders Placed This Encounter   Procedures   • DIET ORDER     Standing Status: Standing      Number of Occurrences: 1      Standing Expiration Date:      Order Specific Question:  Diet:     Answer:  Diabetic [3]     Medical Decision Making, by Problem:  Active Hospital Problems    Diagnosis   • *TIA (transient ischemic attack) [G45.9]  -CT head neg  -Echo and US carotid neg.   -cont ASA and statin  -MRI neg for acute issues  -PT/OT will need placement     • UTI (urinary tract infection) [N39.0]  -Cont ceftriaxone   • HTN (hypertension) [I10]  -cont outpt regimen   • Type 2 diabetes mellitus (HCC) [E11.9]  -SSI and accuchecks    • Dyslipidemia [E78.5]     PT/OT eval needs placement rehab vs SNF    Labs reviewed, Medications reviewed and Radiology images reviewed  Strong catheter: No Strong      DVT Prophylaxis: Heparin        Assessed for rehab: Patient was assess for and/or received rehabilitation services during this hospitalization

## 2017-03-06 NOTE — CARE PLAN
Problem: Safety  Goal: Will remain free from falls  Intervention: Assess risk factors for falls  Bed alarm on and call light within reach. Treaded socks on patient. IV pole on the same side of the bed. Alternate bedrails up. Will continue to monitor.

## 2017-03-06 NOTE — CARE PLAN
Problem: Infection  Goal: Will remain free from infection  Outcome: PROGRESSING AS EXPECTED  Standard precautions in place to prevent infection     Problem: Urinary Elimination:  Goal: Ability to reestablish a normal urinary elimination pattern will improve  Outcome: PROGRESSING AS EXPECTED  Condom cath in place for urinary incontinence     Problem: Infection:  Goal: Will remain free from infection  Outcome: PROGRESSING AS EXPECTED  Standard precautions in place to prevent infection     Problem: Urinary:  Goal: Ability to reestablish a normal urinary elimination pattern will improve  Outcome: PROGRESSING AS EXPECTED  Condom cath in place for urinary incontinence

## 2017-03-06 NOTE — DISCHARGE PLANNING
Per TCN, pt is from a group home on [facility] and has a  names Clarisa who is assisting him with concerns regarding financial issues with the group home.     SW spoke with Clarisa at [facility] ([phone]) who advised pt is not currently on the group home waiver but they did reapply him last week. She states that a report regarding pt's concerns has already been filed with the ombudsman and the ombudsman has spoken with pt. Pt currently resides at [facility] on [address] [place] [address]. The Group Home owner is Haley and the group home phone number is [phone].     SNF referrals are currently pending for pt.

## 2017-03-06 NOTE — PROGRESS NOTES
"Pt irritable at this time and refusing am labs. Pt refusing stating \"it hurts, I want to sleep.\" Will attempt lab draw again later.   "

## 2017-03-06 NOTE — CARE PLAN
Problem: Bowel/Gastric:  Goal: Normal bowel function is maintained or improved  Intervention: Educate patient and significant other/support system about diet, fluid intake, medications and activity to promote bowel function  Patient educated about senna and all questions answered. Patient educated about the effects of prune juice and demonstrates understanding.

## 2017-03-06 NOTE — DISCHARGE PLANNING
Transitional Care Navigator:    TCN met with patient to discuss transitional care services for discharge planning.  Prior to hospitalization pt was living at a group home.  Pt reports he does not qualify for Medicaid; however, he reports having a community SW.  Pt is not satisfied with current group home situation.  [facility] SW has been notified. IRF is recommending SNF.  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  Pt has since changed insurance providers and [facility] is not contracted at this time.  Pt agreed to SNF referrals being placed to all hai facilities before making choice.  Choice form completed and faxed to [facility].  Sw aware.  TCN will follow-up as needed.

## 2017-03-06 NOTE — DISCHARGE PLANNING
Received choice form from Arielle(MATY). Referral sent to [facility], [facility], [facility], [facility], [facility] and [facility].

## 2017-03-06 NOTE — PROGRESS NOTES
AAOx3 at this time however orientation seems to vary. Easily reorients. At times pt becomes irritable and confused to place and event. SOLOMON, LLE weakness present.  Unable to ambulate. Up to BSC w/ x2 assist. 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 Pt inc of urine, condom cath in place. Redness to bijan area and inguinal folds noted. Antifungal cream and nystatin applied. Bed alarm on and call light in reach.

## 2017-03-07 ENCOUNTER — PATIENT OUTREACH (OUTPATIENT)
Dept: HEALTH INFORMATION MANAGEMENT | Facility: OTHER | Age: 82
End: 2017-03-07

## 2017-03-07 LAB
ANION GAP SERPL CALC-SCNC: 6 MMOL/L (ref 0–11.9)
BACTERIA BLD CULT: NORMAL
BUN SERPL-MCNC: 16 MG/DL (ref 8–22)
CALCIUM SERPL-MCNC: 9.6 MG/DL (ref 8.5–10.5)
CHLORIDE SERPL-SCNC: 109 MMOL/L (ref 96–112)
CO2 SERPL-SCNC: 24 MMOL/L (ref 20–33)
CREAT SERPL-MCNC: 0.9 MG/DL (ref 0.5–1.4)
ERYTHROCYTE [DISTWIDTH] IN BLOOD BY AUTOMATED COUNT: 41.3 FL (ref 35.9–50)
GFR SERPL CREATININE-BSD FRML MDRD: >60 ML/MIN/1.73 M 2
GLUCOSE BLD-MCNC: 116 MG/DL (ref 65–99)
GLUCOSE BLD-MCNC: 165 MG/DL (ref 65–99)
GLUCOSE BLD-MCNC: 177 MG/DL (ref 65–99)
GLUCOSE SERPL-MCNC: 121 MG/DL (ref 65–99)
HCT VFR BLD AUTO: 48.5 % (ref 42–52)
HGB BLD-MCNC: 16.4 G/DL (ref 14–18)
MCH RBC QN AUTO: 30.5 PG (ref 27–33)
MCHC RBC AUTO-ENTMCNC: 33.8 G/DL (ref 33.7–35.3)
MCV RBC AUTO: 90.3 FL (ref 81.4–97.8)
PLATELET # BLD AUTO: 315 K/UL (ref 164–446)
PMV BLD AUTO: 9.2 FL (ref 9–12.9)
POTASSIUM SERPL-SCNC: 4 MMOL/L (ref 3.6–5.5)
RBC # BLD AUTO: 5.37 M/UL (ref 4.7–6.1)
SIGNIFICANT IND 70042: NORMAL
SITE SITE: NORMAL
SODIUM SERPL-SCNC: 139 MMOL/L (ref 135–145)
SOURCE SOURCE: NORMAL
WBC # BLD AUTO: 10.5 K/UL (ref 4.8–10.8)

## 2017-03-07 PROCEDURE — A9270 NON-COVERED ITEM OR SERVICE: HCPCS | Performed by: HOSPITALIST

## 2017-03-07 PROCEDURE — 36415 COLL VENOUS BLD VENIPUNCTURE: CPT

## 2017-03-07 PROCEDURE — 700105 HCHG RX REV CODE 258: Performed by: HOSPITALIST

## 2017-03-07 PROCEDURE — 85027 COMPLETE CBC AUTOMATED: CPT

## 2017-03-07 PROCEDURE — G0378 HOSPITAL OBSERVATION PER HR: HCPCS

## 2017-03-07 PROCEDURE — 82962 GLUCOSE BLOOD TEST: CPT

## 2017-03-07 PROCEDURE — 700102 HCHG RX REV CODE 250 W/ 637 OVERRIDE(OP): Performed by: HOSPITALIST

## 2017-03-07 PROCEDURE — 80048 BASIC METABOLIC PNL TOTAL CA: CPT

## 2017-03-07 PROCEDURE — 97535 SELF CARE MNGMENT TRAINING: CPT

## 2017-03-07 PROCEDURE — 99226 PR SUBSEQUENT OBSERVATION CARE,LEVEL III: CPT | Performed by: HOSPITALIST

## 2017-03-07 PROCEDURE — 700111 HCHG RX REV CODE 636 W/ 250 OVERRIDE (IP): Performed by: HOSPITALIST

## 2017-03-07 RX ORDER — ATORVASTATIN CALCIUM 40 MG/1
40 TABLET, FILM COATED ORAL
Status: DISCONTINUED | OUTPATIENT
Start: 2017-03-07 | End: 2017-03-08 | Stop reason: HOSPADM

## 2017-03-07 RX ADMIN — ASPIRIN 325 MG: 325 TABLET, COATED ORAL at 09:57

## 2017-03-07 RX ADMIN — HEPARIN SODIUM 5000 UNITS: 5000 INJECTION, SOLUTION INTRAVENOUS; SUBCUTANEOUS at 14:56

## 2017-03-07 RX ADMIN — Medication 2 TABLET: at 20:30

## 2017-03-07 RX ADMIN — THERA TABS 1 TABLET: TAB at 09:57

## 2017-03-07 RX ADMIN — ATORVASTATIN CALCIUM 40 MG: 40 TABLET, FILM COATED ORAL at 20:30

## 2017-03-07 RX ADMIN — LISINOPRIL 20 MG: 20 TABLET ORAL at 09:57

## 2017-03-07 RX ADMIN — SODIUM CHLORIDE: 9 INJECTION, SOLUTION INTRAVENOUS at 05:00

## 2017-03-07 RX ADMIN — NYSTATIN 1500000 UNITS: 100000 POWDER TOPICAL at 09:57

## 2017-03-07 RX ADMIN — FENOFIBRATE 134 MG: 67 CAPSULE ORAL at 10:08

## 2017-03-07 RX ADMIN — INSULIN LISPRO 2 UNITS: 100 INJECTION, SOLUTION INTRAVENOUS; SUBCUTANEOUS at 20:34

## 2017-03-07 RX ADMIN — LISINOPRIL 20 MG: 20 TABLET ORAL at 20:31

## 2017-03-07 RX ADMIN — ATENOLOL 50 MG: 50 TABLET ORAL at 09:57

## 2017-03-07 RX ADMIN — HEPARIN SODIUM 5000 UNITS: 5000 INJECTION, SOLUTION INTRAVENOUS; SUBCUTANEOUS at 06:07

## 2017-03-07 RX ADMIN — HEPARIN SODIUM 5000 UNITS: 5000 INJECTION, SOLUTION INTRAVENOUS; SUBCUTANEOUS at 20:28

## 2017-03-07 RX ADMIN — MICONAZOLE NITRATE: 20 CREAM TOPICAL at 18:26

## 2017-03-07 RX ADMIN — NYSTATIN 1500000 UNITS: 100000 POWDER TOPICAL at 14:56

## 2017-03-07 RX ADMIN — Medication 2 TABLET: at 09:57

## 2017-03-07 RX ADMIN — MICONAZOLE NITRATE: 20 CREAM TOPICAL at 20:38

## 2017-03-07 RX ADMIN — LORAZEPAM 1 MG: 1 TABLET ORAL at 20:31

## 2017-03-07 RX ADMIN — MICONAZOLE NITRATE: 20 CREAM TOPICAL at 06:07

## 2017-03-07 ASSESSMENT — ENCOUNTER SYMPTOMS
ABDOMINAL PAIN: 0
FLANK PAIN: 0
PND: 0
SHORTNESS OF BREATH: 0
LOSS OF CONSCIOUSNESS: 0
FOCAL WEAKNESS: 0
DOUBLE VISION: 0
SPEECH CHANGE: 0
FEVER: 0
PHOTOPHOBIA: 0
VOMITING: 0
COUGH: 0
MYALGIAS: 0
HEMOPTYSIS: 0
HALLUCINATIONS: 0
NECK PAIN: 0
CHILLS: 0
NERVOUS/ANXIOUS: 0
ORTHOPNEA: 0
NAUSEA: 0
PALPITATIONS: 0
WEIGHT LOSS: 0

## 2017-03-07 ASSESSMENT — PAIN SCALES - GENERAL
PAINLEVEL_OUTOF10: 0
PAINLEVEL_OUTOF10: 0

## 2017-03-07 NOTE — THERAPY
"Occupational Therapy Treatment completed with focus on ADLs, ADL transfers and patient education.  Functional Status:  Pt seen for OT tx. Upon entry pt upset attempting to get OOB. Pt reoriented to time and place. Pt then was pleasant and cooperative. Attempted BSC transfer, max A supine to sit. Pt unable to complete stand or transfer d/t nausea. Pt did c/o being \"confused\" but easy to reorient to situation. Pt required max A for placement of urinal during toileting. Pt also has impaired vision d/t macular degeneration.   Plan of Care: Will benefit from Occupational Therapy 3 times per week  Discharge Recommendations:  Equipment Will Continue to Assess for Equipment Needs. Post-acute therapy recommended before discharged home.    See \"Rehab Therapy-Acute\" Patient Summary Report for complete documentation.    Pt has impaired vision, if going into pt's room make sure to identify yourself as pt does get confused easily and gets agitated with his confusion.    "

## 2017-03-07 NOTE — DISCHARGE PLANNING
Transitional Care Navigator:    TCN met with patient to discuss accepting SNF's.  [facility] and [facility] have accepted pending insurance auth.  Pt has been to [facility] in the past.  TCN explained again to patient that he is going to SNF for a short stay for rehabilitation.  Pt would not make decision on SNF.  Pt stated that he wanted his community SW Clarisa to come to the hospital to see him to discuss options.  [facility] JOCELINE aware and will call Clarisa.  TCN will follow-up as needed.

## 2017-03-07 NOTE — PROGRESS NOTES
Pt awake and agitated. States hes been laying in urine for hours. Patient dry and previously assisted to turn within the hour. Linens changed and condom cath changed to suffice patient. Pt c/o extreme pain in right lower forearm. Sliding clamp removed from PIV to reduce pressure. Pain noted to posterior area of ulnar.

## 2017-03-07 NOTE — DISCHARGE PLANNING
JOCELINE left message for community JOCELINE Mckenna ([phone]) notifying her that pt would like to talk with her before making a choice on SNF. Awaiting return call.

## 2017-03-08 VITALS
DIASTOLIC BLOOD PRESSURE: 77 MMHG | HEART RATE: 78 BPM | TEMPERATURE: 97.6 F | WEIGHT: 253.09 LBS | SYSTOLIC BLOOD PRESSURE: 115 MMHG | OXYGEN SATURATION: 91 % | RESPIRATION RATE: 17 BRPM | BODY MASS INDEX: 32.48 KG/M2 | HEIGHT: 74 IN

## 2017-03-08 LAB
GLUCOSE BLD-MCNC: 122 MG/DL (ref 65–99)
GLUCOSE BLD-MCNC: 230 MG/DL (ref 65–99)

## 2017-03-08 PROCEDURE — 97530 THERAPEUTIC ACTIVITIES: CPT

## 2017-03-08 PROCEDURE — 700111 HCHG RX REV CODE 636 W/ 250 OVERRIDE (IP): Performed by: HOSPITALIST

## 2017-03-08 PROCEDURE — 700102 HCHG RX REV CODE 250 W/ 637 OVERRIDE(OP): Performed by: HOSPITALIST

## 2017-03-08 PROCEDURE — 99217 PR OBSERVATION CARE DISCHARGE: CPT | Performed by: HOSPITALIST

## 2017-03-08 PROCEDURE — 82962 GLUCOSE BLOOD TEST: CPT

## 2017-03-08 PROCEDURE — G0378 HOSPITAL OBSERVATION PER HR: HCPCS

## 2017-03-08 PROCEDURE — 97112 NEUROMUSCULAR REEDUCATION: CPT

## 2017-03-08 PROCEDURE — A9270 NON-COVERED ITEM OR SERVICE: HCPCS | Performed by: HOSPITALIST

## 2017-03-08 RX ORDER — ATORVASTATIN CALCIUM 40 MG/1
40 TABLET, FILM COATED ORAL
Qty: 30 TAB | Status: SHIPPED | DISCHARGE
Start: 2017-03-08

## 2017-03-08 RX ORDER — HEPARIN SODIUM 5000 [USP'U]/ML
5000 INJECTION, SOLUTION INTRAVENOUS; SUBCUTANEOUS EVERY 8 HOURS
Refills: 0 | Status: SHIPPED | DISCHARGE
Start: 2017-03-08

## 2017-03-08 RX ORDER — ASPIRIN 325 MG
325 TABLET ORAL DAILY
Qty: 100 TAB | Status: SHIPPED | DISCHARGE
Start: 2017-03-08

## 2017-03-08 RX ADMIN — Medication 2 TABLET: at 09:00

## 2017-03-08 RX ADMIN — NYSTATIN 1500000 UNITS: 100000 POWDER TOPICAL at 09:25

## 2017-03-08 RX ADMIN — HEPARIN SODIUM 5000 UNITS: 5000 INJECTION, SOLUTION INTRAVENOUS; SUBCUTANEOUS at 06:27

## 2017-03-08 RX ADMIN — ASPIRIN 325 MG: 325 TABLET, COATED ORAL at 09:23

## 2017-03-08 RX ADMIN — HEPARIN SODIUM 5000 UNITS: 5000 INJECTION, SOLUTION INTRAVENOUS; SUBCUTANEOUS at 14:33

## 2017-03-08 RX ADMIN — NYSTATIN 1500000 UNITS: 100000 POWDER TOPICAL at 14:37

## 2017-03-08 RX ADMIN — THERA TABS 1 TABLET: TAB at 09:24

## 2017-03-08 RX ADMIN — ATENOLOL 50 MG: 50 TABLET ORAL at 09:23

## 2017-03-08 RX ADMIN — INSULIN LISPRO 3 UNITS: 100 INJECTION, SOLUTION INTRAVENOUS; SUBCUTANEOUS at 12:23

## 2017-03-08 RX ADMIN — FENOFIBRATE 134 MG: 67 CAPSULE ORAL at 09:22

## 2017-03-08 RX ADMIN — LISINOPRIL 20 MG: 20 TABLET ORAL at 09:24

## 2017-03-08 RX ADMIN — MICONAZOLE NITRATE: 20 CREAM TOPICAL at 12:24

## 2017-03-08 RX ADMIN — MICONAZOLE NITRATE: 20 CREAM TOPICAL at 06:31

## 2017-03-08 ASSESSMENT — PATIENT HEALTH QUESTIONNAIRE - PHQ9
2. FEELING DOWN, DEPRESSED, IRRITABLE, OR HOPELESS: NOT AT ALL
SUM OF ALL RESPONSES TO PHQ QUESTIONS 1-9: 0
1. LITTLE INTEREST OR PLEASURE IN DOING THINGS: NOT AT ALL
SUM OF ALL RESPONSES TO PHQ9 QUESTIONS 1 AND 2: 0

## 2017-03-08 ASSESSMENT — PAIN SCALES - GENERAL: PAINLEVEL_OUTOF10: 0

## 2017-03-08 NOTE — PROGRESS NOTES
Received report from night shift nurse. Patient resting comfortably w/o any complaints of pain or distress. Call light within reach. Bed alarm on. Bed locked and in lowest position. Will continue to monitor.

## 2017-03-08 NOTE — DISCHARGE PLANNING
MD JOCELINE, and hospitalist RN spoke to pt at bedside, pt confirmed he would like to go to [facility]. Per RN, pt needs [facility] transport. Transport form and PCS faxed to [facility].

## 2017-03-08 NOTE — PROGRESS NOTES
Pt lying in bed alert and oriented X3. Disoriented to time. Pt discussed concerns about discharge planning and does not want to go to [facility]. Pt able to swallow multiple pills without difficulty. Bed in low position. Call light within reach. Pt able to use call light appropriately, however only chooses to use it at convenience. Will continue to round routinely.

## 2017-03-08 NOTE — CARE PLAN
Problem: Safety  Goal: Will remain free from injury  Outcome: PROGRESSING AS EXPECTED  Strip alarm in place, pt uses call lights appropriately    Problem: Mobility  Goal: Risk for activity intolerance will decrease  Outcome: PROGRESSING AS EXPECTED  Pt working with therapy    Problem: Safety:  Goal: Will remain free from injury  Outcome: PROGRESSING AS EXPECTED  Strip alarm in place, pt uses call lights appropriately

## 2017-03-08 NOTE — DISCHARGE PLANNING
Patient's choice is [facility]. [facility] to begin authorization process. They have requested updated PT notes. Hedy) notified via voicemail.

## 2017-03-08 NOTE — DISCHARGE PLANNING
Received PCS form from Berna(JOCELINE). Arranged patients transport to [facility] at 1700 via [facility]. Shanna([facility]) and Nanci() notified of transport time via phone.

## 2017-03-08 NOTE — PROGRESS NOTES
Hospital Medicine Progress Note, Adult, Complex               Author: Ferny Plata Date & Time created: 3/7/2017  5:24 PM     ID/CC; 80 y/o m admitted for Acute cystitis and TIA.    Interval History:    No overnight events, denies pain    Review of Systems:  Review of Systems   Constitutional: Negative for fever, chills and weight loss.   Eyes: Negative for double vision and photophobia.        Legally blind   Respiratory: Negative for cough, hemoptysis and shortness of breath.    Cardiovascular: Negative for chest pain, palpitations, orthopnea and PND.   Gastrointestinal: Negative for nausea, vomiting and abdominal pain.   Genitourinary: Negative for hematuria and flank pain.   Musculoskeletal: Negative for myalgias and neck pain.   Skin: Negative for rash.   Neurological: Negative for speech change, focal weakness and loss of consciousness.   Psychiatric/Behavioral: Negative for hallucinations. The patient is not nervous/anxious.        Physical Exam:  Physical Exam   Constitutional: He is oriented to person, place, and time. He appears well-developed.   HENT:   Head: Normocephalic and atraumatic.   Right Ear: External ear normal.   Left Ear: External ear normal.   Eyes: Conjunctivae are normal. Right eye exhibits no discharge. Left eye exhibits no discharge. No scleral icterus.   Neck: Neck supple. No JVD present. No tracheal deviation present.   Cardiovascular: Normal rate, regular rhythm and normal heart sounds.  Exam reveals no gallop and no friction rub.    No murmur heard.  Pulmonary/Chest: Effort normal and breath sounds normal. No stridor. He has no wheezes. He has no rales.   Abdominal: Soft. Bowel sounds are normal. He exhibits no distension. There is no tenderness. There is no rebound.   Musculoskeletal: He exhibits no edema.   Neurological: He is alert and oriented to person, place, and time. No cranial nerve deficit. He exhibits normal muscle tone.   Skin: Skin is warm and dry. He is not diaphoretic.    Psychiatric: He has a normal mood and affect. His behavior is normal.   Nursing note and vitals reviewed.      Labs:        Invalid input(s): [id number]      Recent Labs      17   0549  17   0332   SODIUM  139  139   POTASSIUM  3.9  4.0   CHLORIDE  110  109   CO2  22  24   BUN  16  16   CREATININE  0.89  0.90   CALCIUM  9.4  9.6     Recent Labs      17   0549  17   0332   GLUCOSE  126*  121*     Recent Labs      17   0550  17   0332   RBC  5.23  5.37   HEMOGLOBIN  16.2  16.4   HEMATOCRIT  47.6  48.5   PLATELETCT  295  315     Recent Labs      17   0550  17   0332   WBC  10.3  10.5           Hemodynamics:  Temp (24hrs), Av.8 °C (98.2 °F), Min:36.3 °C (97.4 °F), Max:37.1 °C (98.8 °F)  Temperature: 37.1 °C (98.8 °F)  Pulse  Av.3  Min: 64  Max: 99   Blood Pressure : 136/75 mmHg     Respiratory:    Respiration: 17, Pulse Oximetry: 94 %        RUL Breath Sounds: Clear, RML Breath Sounds: Clear, RLL Breath Sounds: Diminished, JUANITA Breath Sounds: Clear, LLL Breath Sounds: Diminished  Fluids:    Intake/Output Summary (Last 24 hours) at 17 1724  Last data filed at 17 1700   Gross per 24 hour   Intake      0 ml   Output   1700 ml   Net  -1700 ml        GI/Nutrition:  Orders Placed This Encounter   Procedures   • DIET ORDER     Standing Status: Standing      Number of Occurrences: 1      Standing Expiration Date:      Order Specific Question:  Diet:     Answer:  Diabetic [3]     Medical Decision Making, by Problem:  Active Hospital Problems    Diagnosis   • *TIA (transient ischemic attack) [G45.9]  -CT head neg  -Echo no thrombus  and US carotid no sign stenosis.   -cont ASA and switch pravastatin to lipitor  -MRI neg for acute path  -PT/OT     • UTI (urinary tract infection) [N39.0]  -ont ceftriaxone cx neg complete 5 day course   • HTN (hypertension) [I10]  -continue current meds and monitor   • Type 2 diabetes mellitus (HCC) [E11.9]  -stable on SSI and  accuchecks    • Dyslipidemia [E78.5]  -atorvastatin per above     D/c to  SNF when arrangements completed    Plan of care reviewed with the patient and  Multiple  questions answered, total face to face time spent 35 minutes >50% counseling on test results and plan of care and d/c planning    Labs reviewed, Medications reviewed and Radiology images reviewed  Strong catheter: No Strong      DVT Prophylaxis: Heparin        Assessed for rehab: Patient was assess for and/or received rehabilitation services during this hospitalization

## 2017-03-09 ENCOUNTER — RESOLUTE PROFESSIONAL BILLING HOSPITAL PROF FEE (OUTPATIENT)
Dept: HOSPITALIST | Facility: SKILLED NURSING FACILITY | Age: 82
End: 2017-03-09
Payer: MEDICARE

## 2017-03-09 PROCEDURE — 99306 1ST NF CARE HIGH MDM 50: CPT | Mod: AI | Performed by: FAMILY MEDICINE

## 2017-03-09 NOTE — THERAPY
"Physical Therapy Treatment completed.   Bed Mobility:  Supine to Sit: Moderate Assist (x2)  Transfers: Sit to Stand: Minimal Assist (from EOB)  Gait: Level Of Assist: Unable to Participate       Plan of Care: Will benefit from Physical Therapy 3 times per week  Discharge Recommendations: Equipment: Will Continue to assess needs. Post-acute therapy recommended before discharged home.     See \"Rehab Therapy-Acute\" Patient Summary Report for complete documentation.       "

## 2017-03-09 NOTE — PROGRESS NOTES
Pt is alert but confused/forgetful. Pt is able to use call light correctly. Continent of bowel and bladder most of the time. Takes meds whole. Regular diet. Prefers finger foods due to vision problems. Pt was up with physical therapy today. Accuchecks Martinez. Last BM3/7/17. Plan to transport to [facility] by ambulance. All lines and monitors dc'd. Attempted to call s/o but the number we have on file is out of service. Report called to facility.

## 2017-03-09 NOTE — DISCHARGE PLANNING
Referral: SNF    Intervention: Per [facility], transfer [facility] for today at 5:00pm, LANE Hwang aware.    Plan: SNF.

## 2017-03-09 NOTE — DISCHARGE INSTRUCTIONS
Discharge Instructions    Discharged to other by ambulance with escort. Discharged via ambulance, hospital escort: Yes.  Special equipment needed: Not Applicable    Be sure to schedule a follow-up appointment with your primary care doctor or any specialists as instructed.     Discharge Plan:   Diet Plan: Discussed  Activity Level: Discussed  Confirmed Follow up Appointment: Appointment Scheduled  Confirmed Symptoms Management: Discussed  Medication Reconciliation Updated: Yes  Influenza Vaccine Indication: Not indicated: Previously immunized this influenza season and > 8 years of age    I understand that a diet low in cholesterol, fat, and sodium is recommended for good health. Unless I have been given specific instructions below for another diet, I accept this instruction as my diet prescription.   Other diet: Regular, thin liquids    Special Instructions: None    · Is patient discharged on Warfarin / Coumadin?   No     · Is patient Post Blood Transfusion?  No    Depression / Suicide Risk    As you are discharged from this [facility] facility, it is important to learn how to keep safe from harming yourself.    Recognize the warning signs:  · Abrupt changes in personality, positive or negative- including increase in energy   · Giving away possessions  · Change in eating patterns- significant weight changes-  positive or negative  · Change in sleeping patterns- unable to sleep or sleeping all the time   · Unwillingness or inability to communicate  · Depression  · Unusual sadness, discouragement and loneliness  · Talk of wanting to die  · Neglect of personal appearance   · Rebelliousness- reckless behavior  · Withdrawal from people/activities they love  · Confusion- inability to concentrate     If you or a loved one observes any of these behaviors or has concerns about self-harm, here's what you can do:  · Talk about it- your feelings and reasons for harming yourself  · Remove any means that you might use to hurt  yourself (examples: pills, rope, extension cords, firearm)  · Get professional help from the community (Mental Health, Substance Abuse, psychological counseling)  · Do not be alone:Call your Safe Contact- someone whom you trust who will be there for you.  · Call your local CRISIS HOTLINE 665-4619 or 552-930-8283  · Call your local Children's Mobile Crisis Response Team [place] (629) 549-8613 or www.ezTaxi  · Call the toll free National Suicide Prevention Hotlines   · National Suicide Prevention Lifeline 118-355-ELPW (5376)  · National Hope Line Network 800-SUICIDE (253-7828)

## 2017-03-10 PROCEDURE — 99309 SBSQ NF CARE MODERATE MDM 30: CPT | Performed by: FAMILY MEDICINE

## 2017-03-13 LAB — EKG IMPRESSION: NORMAL

## 2017-03-22 PROCEDURE — 99309 SBSQ NF CARE MODERATE MDM 30: CPT | Performed by: FAMILY MEDICINE

## 2017-04-15 PROCEDURE — 99309 SBSQ NF CARE MODERATE MDM 30: CPT | Performed by: PHYSICIAN ASSISTANT

## 2017-04-17 PROCEDURE — 99309 SBSQ NF CARE MODERATE MDM 30: CPT | Performed by: PHYSICIAN ASSISTANT

## 2017-08-29 NOTE — ADDENDUM NOTE
Encounter addended by: Savannah Arnold R.N. on: 8/29/2017 11:06 AM<BR>    Actions taken: Flowsheet accepted

## 2021-01-14 DIAGNOSIS — Z23 NEED FOR VACCINATION: ICD-10-CM

## 2022-02-09 NOTE — PROGRESS NOTES
Addended by: ROBERT ANDERSEN on: 2/9/2022 01:34 PM     Modules accepted: Orders     Patient received, report taken at bedside, appears resting comfortably in bed with respirations even and unlabored, telemetry monitor on, no s/s of distress, bed in lowest position and call light within reach

## 2023-02-16 ENCOUNTER — HOSPITAL ENCOUNTER (OUTPATIENT)
Facility: MEDICAL CENTER | Age: 88
End: 2023-02-16
Attending: FAMILY MEDICINE
Payer: MEDICARE

## 2023-02-16 LAB
APPEARANCE UR: CLEAR
BACTERIA #/AREA URNS HPF: ABNORMAL /HPF
BILIRUB UR QL STRIP.AUTO: NEGATIVE
COLOR UR: YELLOW
EPI CELLS #/AREA URNS HPF: ABNORMAL /HPF
GLUCOSE UR STRIP.AUTO-MCNC: NEGATIVE MG/DL
HYALINE CASTS #/AREA URNS LPF: ABNORMAL /LPF
KETONES UR STRIP.AUTO-MCNC: NEGATIVE MG/DL
LEUKOCYTE ESTERASE UR QL STRIP.AUTO: ABNORMAL
MICRO URNS: ABNORMAL
MUCOUS THREADS #/AREA URNS HPF: ABNORMAL /HPF
NITRITE UR QL STRIP.AUTO: NEGATIVE
PH UR STRIP.AUTO: 5.5 [PH] (ref 5–8)
PROT UR QL STRIP: NEGATIVE MG/DL
RBC # URNS HPF: ABNORMAL /HPF
RBC UR QL AUTO: ABNORMAL
RENAL EPI CELLS #/AREA URNS HPF: ABNORMAL /HPF
SP GR UR STRIP.AUTO: 1.02
UROBILINOGEN UR STRIP.AUTO-MCNC: 0.2 MG/DL
WBC #/AREA URNS HPF: ABNORMAL /HPF

## 2023-02-16 PROCEDURE — 87086 URINE CULTURE/COLONY COUNT: CPT

## 2023-02-16 PROCEDURE — 81001 URINALYSIS AUTO W/SCOPE: CPT

## 2023-02-16 PROCEDURE — 87077 CULTURE AEROBIC IDENTIFY: CPT

## 2023-02-16 PROCEDURE — 87186 SC STD MICRODIL/AGAR DIL: CPT

## 2023-02-19 LAB
BACTERIA UR CULT: ABNORMAL
SIGNIFICANT IND 70042: ABNORMAL
SITE SITE: ABNORMAL
SOURCE SOURCE: ABNORMAL

## 2023-04-24 NOTE — DISCHARGE SUMMARY
PRINCIPAL DIAGNOSES:  1.  Transient ischemic attack.  2.  Urinary tract infection.  3.  Hypertension.  4.  Type 2 diabetes.  5.  Dyslipidemia.  6.  Legal blindness.    PRESENTATION:  Please refer to the H and P by Dr. Hillman.    PERTINENT TEST RESULTS ON THIS HOSPITALIZATION:  White blood cell count is   7.5, hemoglobin 16.4, hematocrit 48.5, platelet count 315.  Sodium is 139,   potassium 4.0, chloride 109, bicarb 24, glucose 121, BUN 16, creatinine 0.9.    Glycohemoglobin was 7.4.  HDL 38, LDL 81, triglycerides 109, total cholesterol   141.  Troponin I was 0.03.  Urinalysis revealed large leukocyte esterase,   20-50 white blood cells.  TSH was 2.8.  Urine culture and blood cultures were   negative.  CT of the head without contrast revealed diffuse atrophy and white   matter changes, no acute intracranial hemorrhage or territorial infarct,   cerebral atherosclerotic disease.  Echocardiogram revealed, compared to the   report of the study done on 01/11/2013, no significant changes noted, no   obvious intracardiac thrombus noted, normal left ventricular systolic   function, left ventricular ejection fraction visually estimated to be 55%,   mild mitral regurgitation, mild aortic insufficiency, mild tricuspid   regurgitation, estimated right ventricular systolic pressure is 25 mmHg.    Carotid duplex revealed antegrade flow in bilateral vertebral arteries, flow   within both subclavian arteries appeared to be within normal limits, mild   bilateral cervical internal carotid artery stenosis less than 50%.  MRI of the   brain without contrast revealed advanced cerebral atrophy, advanced   supratentorial white matter disease most consistent with microvascular   ischemic change, microvascular ischemic change in the chuy, small area of   encephalomalacia in the right and left cerebellar hemisphere consistent with   old infarcts.  No evidence of acute cerebral infarction, acute hemorrhage, or   mass lesion.    HOSPITAL COURSE:   The patient is a 81-year-old male who currently resides at a   group home.  He was transferred to the emergency room for evaluation of   weakness and transient change in mentation.  He was admitted with a working   diagnosis of TIA.  He had stroke workup with the above noted findings.  He was   treated with ceftriaxone for his urinary tract infection.  He has clinically   improved, but deconditioned.  He was evaluated by occupational and physical   therapy and post acute chemotherapy was recommended prior to returning to his   living condition.  The patient was agreeable to go to [facility].  Arrangements   are being made for transfer later today with the following instructions.    DIET:  Diabetic.    ACTIVITY:  As tolerated.    DISCHARGE MEDICATIONS:  Aspirin 325 mg daily, atorvastatin 40 mg at bedtime,   heparin 5000 units every 8 hours, Humalog sliding scale insulin, nystatin   powder to skin folds, acetaminophen 650 mg every 6 hours as needed, atenolol   50 mg daily, fenofibrate 145 mg daily, lisinopril 20 mg 2 times a day,   multivitamin supplement, oxybutynin 5 mg 3 times a day as needed for urinary   urgency.    FOLLOWUP:  The patient should follow up with his primary care provider after   discharge from skilled nursing facility.  His PCP is Carlee Yu.       ____________________________________     MD CELENA FOURNIER / GRAYSON    DD:  03/08/2017 15:20:48  DT:  03/08/2017 15:40:10    D#:  [id number]  Job#:  [id number]    cc: CARLEE GONSALVES   Cyclosporine Counseling:  I discussed with the patient the risks of cyclosporine including but not limited to hypertension, gingival hyperplasia,myelosuppression, immunosuppression, liver damage, kidney damage, neurotoxicity, lymphoma, and serious infections. The patient understands that monitoring is required including baseline blood pressure, CBC, CMP, lipid panel and uric acid, and then 1-2 times monthly CMP and blood pressure.

## 2023-05-09 ENCOUNTER — HOSPITAL ENCOUNTER (OUTPATIENT)
Facility: MEDICAL CENTER | Age: 88
End: 2023-05-09
Attending: FAMILY MEDICINE
Payer: COMMERCIAL

## 2023-05-10 LAB
CREAT UR-MCNC: 72.59 MG/DL
MICROALBUMIN UR-MCNC: 2.2 MG/DL
MICROALBUMIN/CREAT UR: 30 MG/G (ref 0–30)

## 2024-12-17 ENCOUNTER — HOSPITAL ENCOUNTER (EMERGENCY)
Facility: MEDICAL CENTER | Age: 89
End: 2024-12-17
Attending: EMERGENCY MEDICINE
Payer: MEDICARE

## 2024-12-17 VITALS
SYSTOLIC BLOOD PRESSURE: 120 MMHG | WEIGHT: 253 LBS | DIASTOLIC BLOOD PRESSURE: 76 MMHG | OXYGEN SATURATION: 92 % | HEIGHT: 74 IN | TEMPERATURE: 97.8 F | BODY MASS INDEX: 32.47 KG/M2 | HEART RATE: 123 BPM | RESPIRATION RATE: 16 BRPM

## 2024-12-17 DIAGNOSIS — R11.2 NAUSEA AND VOMITING, UNSPECIFIED VOMITING TYPE: ICD-10-CM

## 2024-12-17 PROCEDURE — 99284 EMERGENCY DEPT VISIT MOD MDM: CPT

## 2024-12-17 PROCEDURE — 700111 HCHG RX REV CODE 636 W/ 250 OVERRIDE (IP): Mod: UD | Performed by: EMERGENCY MEDICINE

## 2024-12-17 RX ORDER — ONDANSETRON 4 MG/1
4 TABLET, ORALLY DISINTEGRATING ORAL ONCE
Status: COMPLETED | OUTPATIENT
Start: 2024-12-17 | End: 2024-12-17

## 2024-12-17 RX ORDER — ONDANSETRON 4 MG/1
4 TABLET, ORALLY DISINTEGRATING ORAL EVERY 6 HOURS PRN
Qty: 10 TABLET | Refills: 0 | Status: SHIPPED | OUTPATIENT
Start: 2024-12-17

## 2024-12-17 RX ADMIN — ONDANSETRON 4 MG: 4 TABLET, ORALLY DISINTEGRATING ORAL at 10:46

## 2024-12-17 NOTE — DISCHARGE PLANNING
SW searched for next of kin, per chart review, pts spouse is .  SW performed Enformion search, pts wife is his only possible relative listed, no possible associates available either.

## 2024-12-17 NOTE — ED NOTES
This RN spoke with Lara SHERMAN at [facility].    Number given for director to call back with information on plan of care

## 2024-12-17 NOTE — ED PROVIDER NOTES
ED Provider Note    CHIEF COMPLAINT  Chief Complaint   Patient presents with    Vomiting     Pt BIBA from [facility] for episode of coffee ground emesis witnessed by staff this morning around 0800.     Pt baseline A&O x 1 on 2 liters NC  Known left BBB    COMFORT CARE with POLST at bedside       EXTERNAL RECORDS REVIEWED  H&P completed on 3/3/2017 by Dr. Barrow for cystitis with hematuria, TIA  HPI/ROS      Leonardo Rodney is a 89 y.o. male who presents from skilled nursing facility secondary to evidence of coffee-ground emesis earlier today around 8:00.  Patient is denying pain feeling lightheaded and dizzy, denies use of anticoagulant.  The patient does have a POLST form that is for comfort care only, no IV, no significant intervention.  PAST MEDICAL HISTORY   has a past medical history of CAD (coronary artery disease), Diabetes, Hypertension, and Neuropathy.    SURGICAL HISTORY  patient denies any surgical history    FAMILY HISTORY  History reviewed. No pertinent family history.    SOCIAL HISTORY  Social History     Tobacco Use    Smoking status: Former     Current packs/day: 4.00     Types: Cigarettes    Smokeless tobacco: Never   Substance and Sexual Activity    Alcohol use: No     Comment: quit 1998    Drug use: No    Sexual activity: Not on file       CURRENT MEDICATIONS  Home Medications       Reviewed by Saar Coronado R.N. (Registered Nurse) on 12/17/24 at 0957  Med List Status: Partial     Medication Last Dose Status   acetaminophen 650 MG TABS  Active   aspirin (ASA) 325 MG Tab  Active   atenolol (TENORMIN) 50 MG TABS  Active   atorvastatin (LIPITOR) 40 MG Tab  Active   fenofibrate (TRICOR) 145 MG TABS  Active   heparin 5000 UNIT/ML Solution  Active   insulin lispro (HUMALOG) 100 UNIT/ML Solution  Active   lisinopril (PRINIVIL) 20 MG TABS  Active   multivitamin (THERAGRAN) TABS  Active   nystatin (MYCOSTATIN) Powder  Active   oxybutynin (DITROPAN) 5 MG TABS  Active                    ALLERGIES  No  "Known Allergies    PHYSICAL EXAM  VITAL SIGNS: BP (!) 84/54   Pulse (!) 115   Temp 36.3 °C (97.4 °F) (Temporal)   Resp 20   Ht 1.88 m (6' 2\")   Wt 115 kg (253 lb)   SpO2 93%   BMI 32.48 kg/m²      Nursing notes and vitals reviewed.  Constitutional: Moderate acute distress, Non-toxic appearance.   Eyes: PERRLA, EOMI, Conjunctiva normal, No discharge.   HENT: Dry mucous membranes  Cardiovascular: Tachycardic, Normal rhythm, No murmurs, No rubs, No gallops.   Thorax & Lungs: No respiratory distress, No rales, No rhonchi, No wheezing, No chest tenderness.   Abdomen: Bowel sounds normal, Soft, No tenderness, No guarding, No rebound, No masses, No pulsatile masses.   Skin: Warm, slight pallor, dry, No erythema, No rash.   Extremities: No deformity, no edema, good range of motion range of motion upper lower extremes bilaterally  Neurologic: Alert & oriented x 2, moving arms and legs without difficulty  Psychiatric: Affect normal for clinical presentation.      EKG/LABS  Sinus tachycardia on monitor  I have independently interpreted this EKG    RADIOLOGY/PROCEDURES   Deferred secondary to POLST form  COURSE & MEDICAL DECISION MAKING    ASSESSMENT, COURSE AND PLAN  Care Narrative: This is a 89-year-old gentleman with multiple medical problems who is on comfort care.  Pulses at bedside that reveals no evidence for us to resuscitate this patient.  POLST form states no IV, IV fluids, antibiotics, parenteral nutrition.  Is only asking for comfort.  Patient received Zofran 4 mg, no longer vomiting.  He is tachycardic and hypotensive and I do believe the patient will have a very poor prognosis.  We did talk to the  here who did exhaustive search for POA or family no such luck.  The patient at this point will have his wishes honored and we will not start IVs, IV fluid resuscitation, imaging or further evaluation.  Received Zofran prescription for Zofran for comfort only.  This was discussed at length with the " skilled nursing facility staff and they do agree with the plan they can arrange for comfort care and hospice at the facility.  For this reason, the patient will be discharged back to the skilled nursing facility and probable comfort care and possible patient's demise.      ADDITIONAL PROBLEMS MANAGED  Multiple chronic medical problems contributing to his presentation today.    DISPOSITION AND DISCUSSIONS      Discussion of management with other [facility] or appropriate source(s): Social Work who states the patient does not have a power of  or family and the patient does have a POLST that we should alter the POLST by sending him back with no significant invention.   Lisseth, the patient's nurse has discussed the patient at length with skilled nursing facility staff and they do agree with the plan the patient to go back to the facility for further evaluation and management.      Barriers to care at this time, including but not limited to:  The patient does not have family .     Decision tools and prescription drugs considered including, but not limited to: Consider resuscitation but here the patient has a POLST that clearly states no intervention, no IV and: IV fluids.    FINAL DIAGNOSIS  1. Nausea and vomiting, unspecified vomiting type          DISPOSITION:  Patient will be discharged home in stable condition.    FOLLOW UP:  Tory Yu, A.P.N.  [id number]  [place] [address] [phone]    Schedule an appointment as soon as possible for a visit   As needed      OUTPATIENT MEDICATIONS:  New Prescriptions    ONDANSETRON (ZOFRAN ODT) 4 MG TABLET DISPERSIBLE    Take 1 Tablet by mouth every 6 hours as needed for Nausea/Vomiting.          Electronically signed by: Luis Alberto Mortensen D.O., 12/17/2024 10:01 AM

## 2024-12-17 NOTE — ED NOTES
Per Андрей Tate, their paperwork does not reflect a POA, no family noted. Per Lea, follow POLST and comfort care measures. ERP notified

## 2024-12-17 NOTE — ED TRIAGE NOTES
".  Chief Complaint   Patient presents with    Vomiting     Pt BIBA from [facility] for episode of coffee ground emesis witnessed by staff this morning around 0800.     Pt baseline A&O x 1 on 2 liters NC  Known left BBB    COMFORT CARE with POLST at bedside     Pt A&O x 4 for this RN    No interventions by EMS in route.     .BP 97/57   Pulse (!) 121   Temp 36.3 °C (97.4 °F) (Temporal)   Resp 18   Ht 1.88 m (6' 2\")   Wt 115 kg (253 lb)   SpO2 93%   BMI 32.48 kg/m²     "

## 2024-12-17 NOTE — DISCHARGE INSTRUCTIONS
The patient is comfort care does not require IV fluids, IV antibiotics, full workup for possible GI bleed as the patient does not want treatment based on his pulse.  He received Zofran and is not vomiting currently.  I prescribed him a prescription for Zofran for outpatient.  Unfortunately, there is no care to be rendered secondary to his previous wishes and he is more comfortable now with Zofran.

## 2024-12-17 NOTE — ED NOTES
Report to [facility]    Copy of POLST transported with pt.   Pt on 2 liters NC and transported with all their belongings

## 2024-12-17 NOTE — ED NOTES
Report to Lara SHERMAN at [facility]    Social work notified of report given. Pt bed bound at baseline per primary RN at [place]